# Patient Record
Sex: FEMALE | Race: WHITE | NOT HISPANIC OR LATINO | Employment: OTHER | URBAN - NONMETROPOLITAN AREA
[De-identification: names, ages, dates, MRNs, and addresses within clinical notes are randomized per-mention and may not be internally consistent; named-entity substitution may affect disease eponyms.]

---

## 2017-06-08 ENCOUNTER — OFFICE VISIT (OUTPATIENT)
Dept: CARDIOLOGY | Facility: CLINIC | Age: 82
End: 2017-06-08
Payer: MEDICARE

## 2017-06-08 VITALS
DIASTOLIC BLOOD PRESSURE: 70 MMHG | SYSTOLIC BLOOD PRESSURE: 130 MMHG | HEIGHT: 62 IN | HEART RATE: 70 BPM | BODY MASS INDEX: 22.63 KG/M2 | WEIGHT: 123 LBS

## 2017-06-08 DIAGNOSIS — I10 ESSENTIAL HYPERTENSION: ICD-10-CM

## 2017-06-08 DIAGNOSIS — I48.0 PAROXYSMAL ATRIAL FIBRILLATION (HCC): ICD-10-CM

## 2017-06-08 DIAGNOSIS — I49.1 APC (ATRIAL PREMATURE CONTRACTIONS): ICD-10-CM

## 2017-06-08 PROCEDURE — 99213 OFFICE O/P EST LOW 20 MIN: CPT | Performed by: INTERNAL MEDICINE

## 2017-06-08 RX ORDER — ACETAMINOPHEN 160 MG
TABLET,DISINTEGRATING ORAL
COMMUNITY
End: 2019-02-15 | Stop reason: CLARIF

## 2017-06-08 RX ORDER — DILTIAZEM HYDROCHLORIDE 180 MG/1
180 CAPSULE, EXTENDED RELEASE ORAL DAILY
Qty: 30 CAP | Refills: 11 | Status: SHIPPED | OUTPATIENT
Start: 2017-06-08 | End: 2018-06-08 | Stop reason: SDUPTHER

## 2017-06-08 RX ORDER — LISINOPRIL 10 MG/1
10 TABLET ORAL DAILY
Qty: 30 TAB | Refills: 11 | Status: SHIPPED | OUTPATIENT
Start: 2017-06-08 | End: 2018-06-08 | Stop reason: SDUPTHER

## 2017-06-08 ASSESSMENT — ENCOUNTER SYMPTOMS
HEARTBURN: 0
FEVER: 0
BLURRED VISION: 0
EYE DISCHARGE: 0
DIZZINESS: 0
SHORTNESS OF BREATH: 0
HEADACHES: 0
DEPRESSION: 0
NERVOUS/ANXIOUS: 0
BRUISES/BLEEDS EASILY: 0
CHILLS: 0
COUGH: 0
PALPITATIONS: 0
NAUSEA: 0
MYALGIAS: 0
PND: 0

## 2017-06-08 NOTE — PROGRESS NOTES
Subjective:   Alissa Key is a 89 y.o. 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  No past surgical history on file.  No family history on file.  History   Smoking status   • Never Smoker    Smokeless tobacco   • Never Used     No Known Allergies  Outpatient Encounter Prescriptions as of 6/8/2017   Medication Sig Dispense Refill   • Cholecalciferol (VITAMIN D3) 2000 UNIT Cap Take  by mouth.     • diltiazem (DILT-XR) 180 MG XR capsule Take 1 Cap by mouth every day. 30 Cap 11   • lisinopril (PRINIVIL) 10 MG Tab Take 1 Tab by mouth every day. 30 Tab 11   • aspirin (ASA) 325 MG Tab Take 325 mg by mouth every 6 hours as needed. Pt. Taking 1/4 of the tab     • latanoprost (XALATAN) 0.005 % Solution Place 1 Drop in both eyes every evening.     • [DISCONTINUED] lisinopril (PRINIVIL) 10 MG Tab Take 1 Tab by mouth every day. 30 Tab 11   • [DISCONTINUED] diltiazem (DILT-XR) 180 MG XR capsule Take 1 Cap by mouth every day. 30 Cap 11   • diltiazem CD (CARDIZEM CD) 180 MG CAPSULE SR 24 HR Take 1 Cap by mouth every day. (Patient taking differently: Take 180 mg by mouth every evening.) 30 Cap 11     No facility-administered encounter medications on file as of 6/8/2017.     Review of Systems   Constitutional: Positive for malaise/fatigue. Negative for fever and chills.   Eyes: Negative for blurred vision and discharge.   Respiratory: Negative for cough and shortness of breath.    Cardiovascular: Negative for chest pain, palpitations, leg swelling and PND.   Gastrointestinal: Negative for heartburn and nausea.   Genitourinary: Negative for dysuria and urgency.   Musculoskeletal: Negative for myalgias.   Skin: Negative for itching and rash.   Neurological: Negative for dizziness and headaches.  "  Endo/Heme/Allergies: Negative for environmental allergies. Does not bruise/bleed easily.   Psychiatric/Behavioral: Negative for depression. The patient is not nervous/anxious.         Objective:   /70 mmHg  Pulse 70  Ht 1.575 m (5' 2\")  Wt 55.792 kg (123 lb)  BMI 22.49 kg/m2    Physical Exam   Constitutional: She is oriented to person, place, and time. She appears well-developed and well-nourished.   HENT:   Head: Normocephalic and atraumatic.   Eyes: Conjunctivae and EOM are normal. No scleral icterus.   Neck: Neck supple. No JVD present. No thyromegaly present.   Cardiovascular: Normal rate and regular rhythm.   No extrasystoles are present. Exam reveals no gallop and no friction rub.    Murmur heard.   Systolic murmur is present with a grade of 1/6   Blood pressure 140 systolic, both arms sitting by palpation   Pulmonary/Chest: Effort normal and breath sounds normal. No respiratory distress. She has no wheezes. She has no rales. She exhibits no tenderness.   Abdominal: Soft. Bowel sounds are normal. She exhibits no distension and no mass. There is no tenderness.   Neurological: She is alert and oriented to person, place, and time. Coordination normal.   Skin: Skin is warm and dry. No rash noted. No pallor.   Psychiatric: She has a normal mood and affect. Her behavior is normal. Judgment and thought content normal.       Assessment:     1. Essential hypertension  diltiazem (DILT-XR) 180 MG XR capsule    lisinopril (PRINIVIL) 10 MG Tab   2. Paroxysmal atrial fibrillation (CMS-HCC)     3. APC (atrial premature contractions)  diltiazem (DILT-XR) 180 MG XR capsule       Medical Decision Making:  Today's Assessment / Status / Plan:   Clinical status stable  No syncope. No symptomatic A. Fib.  Home blood pressures well Controlled  No change  In  meds  Return in 1 year    "

## 2017-06-08 NOTE — Clinical Note
[facility]   [address] [place] [address]  Phone: [phone]  Fax: [phone]              Alissa Key  10/26/1921    Encounter Date: 6/8/2017    Simone Schmidt M.D.          PROGRESS NOTE:  Subjective:   Alissa Key is a 89 y.o. 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  No past surgical history on file.  No family history on file.  History   Smoking status   • Never Smoker    Smokeless tobacco   • Never Used     No Known Allergies  Outpatient Encounter Prescriptions as of 6/8/2017   Medication Sig Dispense Refill   • Cholecalciferol (VITAMIN D3) 2000 UNIT Cap Take  by mouth.     • diltiazem (DILT-XR) 180 MG XR capsule Take 1 Cap by mouth every day. 30 Cap 11   • lisinopril (PRINIVIL) 10 MG Tab Take 1 Tab by mouth every day. 30 Tab 11   • aspirin (ASA) 325 MG Tab Take 325 mg by mouth every 6 hours as needed. Pt. Taking 1/4 of the tab     • latanoprost (XALATAN) 0.005 % Solution Place 1 Drop in both eyes every evening.     • [DISCONTINUED] lisinopril (PRINIVIL) 10 MG Tab Take 1 Tab by mouth every day. 30 Tab 11   • [DISCONTINUED] diltiazem (DILT-XR) 180 MG XR capsule Take 1 Cap by mouth every day. 30 Cap 11   • diltiazem CD (CARDIZEM CD) 180 MG CAPSULE SR 24 HR Take 1 Cap by mouth every day. (Patient taking differently: Take 180 mg by mouth every evening.) 30 Cap 11     No facility-administered encounter medications on file as of 6/8/2017.     Review of Systems   Constitutional: Positive for malaise/fatigue. Negative for fever and chills.   Eyes: Negative for blurred vision and discharge.   Respiratory: Negative for cough and shortness of breath.    Cardiovascular: Negative for chest pain,  "palpitations, leg swelling and PND.   Gastrointestinal: Negative for heartburn and nausea.   Genitourinary: Negative for dysuria and urgency.   Musculoskeletal: Negative for myalgias.   Skin: Negative for itching and rash.   Neurological: Negative for dizziness and headaches.   Endo/Heme/Allergies: Negative for environmental allergies. Does not bruise/bleed easily.   Psychiatric/Behavioral: Negative for depression. The patient is not nervous/anxious.         Objective:   /70 mmHg  Pulse 70  Ht 1.575 m (5' 2\")  Wt 55.792 kg (123 lb)  BMI 22.49 kg/m2    Physical Exam   Constitutional: She is oriented to person, place, and time. She appears well-developed and well-nourished.   HENT:   Head: Normocephalic and atraumatic.   Eyes: Conjunctivae and EOM are normal. No scleral icterus.   Neck: Neck supple. No JVD present. No thyromegaly present.   Cardiovascular: Normal rate and regular rhythm.   No extrasystoles are present. Exam reveals no gallop and no friction rub.    Murmur heard.   Systolic murmur is present with a grade of 1/6   Blood pressure 140 systolic, both arms sitting by palpation   Pulmonary/Chest: Effort normal and breath sounds normal. No respiratory distress. She has no wheezes. She has no rales. She exhibits no tenderness.   Abdominal: Soft. Bowel sounds are normal. She exhibits no distension and no mass. There is no tenderness.   Neurological: She is alert and oriented to person, place, and time. Coordination normal.   Skin: Skin is warm and dry. No rash noted. No pallor.   Psychiatric: She has a normal mood and affect. Her behavior is normal. Judgment and thought content normal.       Assessment:     1. Essential hypertension  diltiazem (DILT-XR) 180 MG XR capsule    lisinopril (PRINIVIL) 10 MG Tab   2. Paroxysmal atrial fibrillation (CMS-HCC)     3. APC (atrial premature contractions)  diltiazem (DILT-XR) 180 MG XR capsule       Medical Decision Making:  Today's Assessment / Status / Plan:   "   Clinical status stable  No syncope. No symptomatic A. Fib.  Home blood pressures well Controlled  No change  In  meds  Return in 1 year        WILMA Gloria  [address] [facility] [address]  VIA Facsimile: [phone]

## 2017-12-10 ENCOUNTER — HOSPITAL ENCOUNTER (INPATIENT)
Facility: MEDICAL CENTER | Age: 82
LOS: 1 days | DRG: 481 | End: 2017-12-12
Attending: EMERGENCY MEDICINE | Admitting: HOSPITALIST
Payer: MEDICARE

## 2017-12-10 ENCOUNTER — HOSPITAL ENCOUNTER (OUTPATIENT)
Dept: RADIOLOGY | Facility: MEDICAL CENTER | Age: 82
End: 2017-12-10

## 2017-12-10 ENCOUNTER — APPOINTMENT (OUTPATIENT)
Dept: RADIOLOGY | Facility: MEDICAL CENTER | Age: 82
DRG: 481 | End: 2017-12-10
Attending: EMERGENCY MEDICINE
Payer: MEDICARE

## 2017-12-10 ENCOUNTER — APPOINTMENT (OUTPATIENT)
Dept: RADIOLOGY | Facility: MEDICAL CENTER | Age: 82
DRG: 481 | End: 2017-12-10
Attending: ORTHOPAEDIC SURGERY
Payer: MEDICARE

## 2017-12-10 ENCOUNTER — RESOLUTE PROFESSIONAL BILLING HOSPITAL PROF FEE (OUTPATIENT)
Dept: HOSPITALIST | Facility: MEDICAL CENTER | Age: 82
End: 2017-12-10
Payer: MEDICARE

## 2017-12-10 DIAGNOSIS — S72.142A CLOSED DISPLACED INTERTROCHANTERIC FRACTURE OF LEFT FEMUR, INITIAL ENCOUNTER (HCC): ICD-10-CM

## 2017-12-10 LAB
ALBUMIN SERPL BCP-MCNC: 3.5 G/DL (ref 3.2–4.9)
ALBUMIN/GLOB SERPL: 1.4 G/DL
ALP SERPL-CCNC: 67 U/L (ref 30–99)
ALT SERPL-CCNC: 13 U/L (ref 2–50)
ANION GAP SERPL CALC-SCNC: 7 MMOL/L (ref 0–11.9)
APTT PPP: 26.8 SEC (ref 24.7–36)
AST SERPL-CCNC: 18 U/L (ref 12–45)
BILIRUB SERPL-MCNC: 0.7 MG/DL (ref 0.1–1.5)
BUN SERPL-MCNC: 15 MG/DL (ref 8–22)
CALCIUM SERPL-MCNC: 8.8 MG/DL (ref 8.5–10.5)
CHLORIDE SERPL-SCNC: 105 MMOL/L (ref 96–112)
CO2 SERPL-SCNC: 25 MMOL/L (ref 20–33)
CREAT SERPL-MCNC: 0.59 MG/DL (ref 0.5–1.4)
EKG IMPRESSION: NORMAL
GFR SERPL CREATININE-BSD FRML MDRD: >60 ML/MIN/1.73 M 2
GLOBULIN SER CALC-MCNC: 2.5 G/DL (ref 1.9–3.5)
GLUCOSE SERPL-MCNC: 141 MG/DL (ref 65–99)
INR PPP: 1 (ref 0.87–1.13)
POTASSIUM SERPL-SCNC: 4.1 MMOL/L (ref 3.6–5.5)
PROT SERPL-MCNC: 6 G/DL (ref 6–8.2)
PROTHROMBIN TIME: 12.9 SEC (ref 12–14.6)
SODIUM SERPL-SCNC: 137 MMOL/L (ref 135–145)

## 2017-12-10 PROCEDURE — 160002 HCHG RECOVERY MINUTES (STAT): Performed by: ORTHOPAEDIC SURGERY

## 2017-12-10 PROCEDURE — 502000 HCHG MISC OR IMPLANTS RC 0278: Performed by: ORTHOPAEDIC SURGERY

## 2017-12-10 PROCEDURE — 72170 X-RAY EXAM OF PELVIS: CPT

## 2017-12-10 PROCEDURE — A9270 NON-COVERED ITEM OR SERVICE: HCPCS | Performed by: INTERNAL MEDICINE

## 2017-12-10 PROCEDURE — A6402 STERILE GAUZE <= 16 SQ IN: HCPCS | Performed by: ORTHOPAEDIC SURGERY

## 2017-12-10 PROCEDURE — G0378 HOSPITAL OBSERVATION PER HR: HCPCS

## 2017-12-10 PROCEDURE — 71010 DX-CHEST-PORTABLE (1 VIEW): CPT

## 2017-12-10 PROCEDURE — 93005 ELECTROCARDIOGRAM TRACING: CPT | Performed by: EMERGENCY MEDICINE

## 2017-12-10 PROCEDURE — 160048 HCHG OR STATISTICAL LEVEL 1-5: Performed by: ORTHOPAEDIC SURGERY

## 2017-12-10 PROCEDURE — 700102 HCHG RX REV CODE 250 W/ 637 OVERRIDE(OP): Performed by: ORTHOPAEDIC SURGERY

## 2017-12-10 PROCEDURE — 96374 THER/PROPH/DIAG INJ IV PUSH: CPT

## 2017-12-10 PROCEDURE — 700101 HCHG RX REV CODE 250

## 2017-12-10 PROCEDURE — 160041 HCHG SURGERY MINUTES - EA ADDL 1 MIN LEVEL 4: Performed by: ORTHOPAEDIC SURGERY

## 2017-12-10 PROCEDURE — 700102 HCHG RX REV CODE 250 W/ 637 OVERRIDE(OP): Performed by: INTERNAL MEDICINE

## 2017-12-10 PROCEDURE — 99291 CRITICAL CARE FIRST HOUR: CPT

## 2017-12-10 PROCEDURE — 99220 PR INITIAL OBSERVATION CARE,LEVL III: CPT | Performed by: INTERNAL MEDICINE

## 2017-12-10 PROCEDURE — A9270 NON-COVERED ITEM OR SERVICE: HCPCS | Performed by: ORTHOPAEDIC SURGERY

## 2017-12-10 PROCEDURE — 501838 HCHG SUTURE GENERAL: Performed by: ORTHOPAEDIC SURGERY

## 2017-12-10 PROCEDURE — 700111 HCHG RX REV CODE 636 W/ 250 OVERRIDE (IP)

## 2017-12-10 PROCEDURE — 700111 HCHG RX REV CODE 636 W/ 250 OVERRIDE (IP): Performed by: EMERGENCY MEDICINE

## 2017-12-10 PROCEDURE — 502240 HCHG MISC OR SUPPLY RC 0272: Performed by: ORTHOPAEDIC SURGERY

## 2017-12-10 PROCEDURE — 85730 THROMBOPLASTIN TIME PARTIAL: CPT

## 2017-12-10 PROCEDURE — 160035 HCHG PACU - 1ST 60 MINS PHASE I: Performed by: ORTHOPAEDIC SURGERY

## 2017-12-10 PROCEDURE — 0QS736Z REPOSITION LEFT UPPER FEMUR WITH INTRAMEDULLARY INTERNAL FIXATION DEVICE, PERCUTANEOUS APPROACH: ICD-10-PCS | Performed by: ORTHOPAEDIC SURGERY

## 2017-12-10 PROCEDURE — 700105 HCHG RX REV CODE 258: Performed by: INTERNAL MEDICINE

## 2017-12-10 PROCEDURE — 160029 HCHG SURGERY MINUTES - 1ST 30 MINS LEVEL 4: Performed by: ORTHOPAEDIC SURGERY

## 2017-12-10 PROCEDURE — 73552 X-RAY EXAM OF FEMUR 2/>: CPT | Mod: LT

## 2017-12-10 PROCEDURE — 501445 HCHG STAPLER, SKIN DISP: Performed by: ORTHOPAEDIC SURGERY

## 2017-12-10 PROCEDURE — 80053 COMPREHEN METABOLIC PANEL: CPT

## 2017-12-10 PROCEDURE — 85610 PROTHROMBIN TIME: CPT

## 2017-12-10 PROCEDURE — 160009 HCHG ANES TIME/MIN: Performed by: ORTHOPAEDIC SURGERY

## 2017-12-10 DEVICE — IMPLANTABLE DEVICE: Type: IMPLANTABLE DEVICE | Status: FUNCTIONAL

## 2017-12-10 RX ORDER — LATANOPROST 50 UG/ML
1 SOLUTION/ DROPS OPHTHALMIC NIGHTLY
Status: DISCONTINUED | OUTPATIENT
Start: 2017-12-10 | End: 2017-12-12 | Stop reason: HOSPADM

## 2017-12-10 RX ORDER — AMOXICILLIN 250 MG
2 CAPSULE ORAL 2 TIMES DAILY
Status: DISCONTINUED | OUTPATIENT
Start: 2017-12-10 | End: 2017-12-12 | Stop reason: HOSPADM

## 2017-12-10 RX ORDER — DILTIAZEM HYDROCHLORIDE 180 MG/1
180 CAPSULE, COATED, EXTENDED RELEASE ORAL EVERY EVENING
Status: DISCONTINUED | OUTPATIENT
Start: 2017-12-10 | End: 2017-12-12 | Stop reason: HOSPADM

## 2017-12-10 RX ORDER — LISINOPRIL 10 MG/1
10 TABLET ORAL DAILY
Status: DISCONTINUED | OUTPATIENT
Start: 2017-12-10 | End: 2017-12-12 | Stop reason: HOSPADM

## 2017-12-10 RX ORDER — HYDROMORPHONE HYDROCHLORIDE 2 MG/ML
0.25 INJECTION, SOLUTION INTRAMUSCULAR; INTRAVENOUS; SUBCUTANEOUS
Status: DISCONTINUED | OUTPATIENT
Start: 2017-12-10 | End: 2017-12-12 | Stop reason: HOSPADM

## 2017-12-10 RX ORDER — SODIUM CHLORIDE 9 MG/ML
INJECTION, SOLUTION INTRAVENOUS CONTINUOUS
Status: DISCONTINUED | OUTPATIENT
Start: 2017-12-10 | End: 2017-12-11

## 2017-12-10 RX ORDER — ACETAMINOPHEN 325 MG/1
650 TABLET ORAL EVERY 6 HOURS PRN
Status: DISCONTINUED | OUTPATIENT
Start: 2017-12-10 | End: 2017-12-12 | Stop reason: HOSPADM

## 2017-12-10 RX ORDER — BISACODYL 10 MG
10 SUPPOSITORY, RECTAL RECTAL
Status: DISCONTINUED | OUTPATIENT
Start: 2017-12-10 | End: 2017-12-12 | Stop reason: HOSPADM

## 2017-12-10 RX ORDER — OXYCODONE HYDROCHLORIDE 5 MG/1
5 TABLET ORAL
Status: DISCONTINUED | OUTPATIENT
Start: 2017-12-10 | End: 2017-12-12 | Stop reason: HOSPADM

## 2017-12-10 RX ORDER — POLYETHYLENE GLYCOL 3350 17 G/17G
1 POWDER, FOR SOLUTION ORAL
Status: DISCONTINUED | OUTPATIENT
Start: 2017-12-10 | End: 2017-12-12 | Stop reason: HOSPADM

## 2017-12-10 RX ORDER — MORPHINE SULFATE 4 MG/ML
2 INJECTION, SOLUTION INTRAMUSCULAR; INTRAVENOUS
Status: DISCONTINUED | OUTPATIENT
Start: 2017-12-10 | End: 2017-12-10

## 2017-12-10 RX ORDER — MULTIVIT-MIN/FA/LYCOPEN/LUTEIN .4-300-25
1 TABLET ORAL DAILY
COMMUNITY

## 2017-12-10 RX ORDER — ACETAMINOPHEN 500 MG
500 TABLET ORAL 4 TIMES DAILY
Status: DISCONTINUED | OUTPATIENT
Start: 2017-12-10 | End: 2017-12-12 | Stop reason: HOSPADM

## 2017-12-10 RX ORDER — ASPIRIN 325 MG
325 TABLET ORAL EVERY 6 HOURS PRN
Status: DISCONTINUED | OUTPATIENT
Start: 2017-12-10 | End: 2017-12-10

## 2017-12-10 RX ORDER — LABETALOL HYDROCHLORIDE 5 MG/ML
INJECTION, SOLUTION INTRAVENOUS
Status: COMPLETED
Start: 2017-12-10 | End: 2017-12-10

## 2017-12-10 RX ORDER — OXYCODONE HYDROCHLORIDE 10 MG/1
5 TABLET ORAL EVERY 6 HOURS PRN
Status: DISCONTINUED | OUTPATIENT
Start: 2017-12-10 | End: 2017-12-10

## 2017-12-10 RX ORDER — OXYCODONE HYDROCHLORIDE 5 MG/1
2.5 TABLET ORAL
Status: DISCONTINUED | OUTPATIENT
Start: 2017-12-10 | End: 2017-12-12 | Stop reason: HOSPADM

## 2017-12-10 RX ORDER — MORPHINE SULFATE 4 MG/ML
2 INJECTION, SOLUTION INTRAMUSCULAR; INTRAVENOUS
Status: DISCONTINUED | OUTPATIENT
Start: 2017-12-10 | End: 2017-12-12 | Stop reason: HOSPADM

## 2017-12-10 RX ADMIN — ACETAMINOPHEN 500 MG: 500 TABLET ORAL at 22:14

## 2017-12-10 RX ADMIN — LABETALOL HYDROCHLORIDE 5 MG: 5 INJECTION, SOLUTION INTRAVENOUS at 20:12

## 2017-12-10 RX ADMIN — SODIUM CHLORIDE: 9 INJECTION, SOLUTION INTRAVENOUS at 22:10

## 2017-12-10 RX ADMIN — DILTIAZEM HYDROCHLORIDE 180 MG: 180 CAPSULE, COATED, EXTENDED RELEASE ORAL at 22:16

## 2017-12-10 RX ADMIN — LATANOPROST 1 DROP: 50 SOLUTION OPHTHALMIC at 22:17

## 2017-12-10 RX ADMIN — STANDARDIZED SENNA CONCENTRATE AND DOCUSATE SODIUM 2 TABLET: 8.6; 5 TABLET, FILM COATED ORAL at 22:14

## 2017-12-10 RX ADMIN — MORPHINE SULFATE 2 MG: 4 INJECTION INTRAVENOUS at 17:11

## 2017-12-10 ASSESSMENT — ENCOUNTER SYMPTOMS
DIARRHEA: 0
PALPITATIONS: 0
DIZZINESS: 0
HEADACHES: 0
WEAKNESS: 0
WHEEZING: 0
CHILLS: 0
INSOMNIA: 0
NERVOUS/ANXIOUS: 0
CONSTIPATION: 0
VOMITING: 0
FOCAL WEAKNESS: 0
COUGH: 0
EYE REDNESS: 0
EYE PAIN: 0
FEVER: 0
LOSS OF CONSCIOUSNESS: 0
ABDOMINAL PAIN: 0
FALLS: 1
TREMORS: 0
NAUSEA: 0
SEIZURES: 0
MYALGIAS: 1
BLOOD IN STOOL: 0
SHORTNESS OF BREATH: 0
HEMOPTYSIS: 0

## 2017-12-10 ASSESSMENT — PAIN SCALES - GENERAL
PAINLEVEL_OUTOF10: 0

## 2017-12-10 ASSESSMENT — LIFESTYLE VARIABLES: DO YOU DRINK ALCOHOL: NO

## 2017-12-10 NOTE — ED PROVIDER NOTES
ED Provider Note    CHIEF COMPLAINT  Chief Complaint   Patient presents with   • T-5000 GLF     Tripped over wood stove. Denies LOC. Denies head, neck, or back pain.    • Hip Injury     Left femur fracture. Increased pain with movement. CMS is intact.        HPI  Alissa Key is a 89 y.o. female who presents For evaluation of acute left femur fracture. The patient is a high functioning 89-year-old female. 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 She had a ground level fall on a carpeted surface. She felt a snapping sensation on her left hip. She was seen at the ER in [facility] and confirmed hip fracture. There is no other injuries reported. Lab for studies were performed which were normal she was given some morphine and transferred here for higher level of care as they do not have orthopedics    REVIEW OF SYSTEMS  See HPI for further details. No loss of consciousness numbness weakness or tingling All other systems are negative.     PAST MEDICAL HISTORY  Past Medical History:   Diagnosis Date   • Hypertension      Hypertension  FAMILY HISTORY  No history of bleeding disorder    SOCIAL HISTORY  Social History     Social History   • Marital status:      Spouse name: N/A   • Number of children: N/A   • Years of education: N/A     Social History Main Topics   • Smoking status: Never Smoker   • Smokeless tobacco: Never Used   • Alcohol use No   • Drug use: No   • Sexual activity: Not on file     Other Topics Concern   • Not on file     Social History Narrative   • No narrative on file       SURGICAL HISTORY  No past surgical history on file.    CURRENT MEDICATIONS    Current Facility-Administered Medications:   •  morphine (pf) 4 mg/ml injection 2 mg, 2 mg, Intravenous, Q HOUR PRN, Woody Das M.D.  •  aspirin (ASA) tablet 325 mg, 325 mg, Oral, Q6HRS PRN, Enzo Engle M.D.  •  vitamin D (cholecalciferol) tablet 2,000 Units, 2,000 Units, Oral, DAILY, Enzo DELACRUZ  ALEXY Engle  •  latanoprost (XALATAN) 0.005 % ophthalmic solution 1 Drop, 1 Drop, Both Eyes, Nightly, Enzo Engle M.D.  •  diltiazem CD (CARDIZEM CD) capsule 180 mg, 180 mg, Oral, Q EVENING, Enzo Engle M.D.  •  lisinopril (PRINIVIL) 10 MG tablet 10 mg, 10 mg, Oral, DAILY, Enzo Engle M.D.  •  senna-docusate (PERICOLACE or SENOKOT S) 8.6-50 MG per tablet 2 Tab, 2 Tab, Oral, BID **AND** polyethylene glycol/lytes (MIRALAX) PACKET 1 Packet, 1 Packet, Oral, QDAY PRN **AND** magnesium hydroxide (MILK OF MAGNESIA) suspension 30 mL, 30 mL, Oral, QDAY PRN **AND** bisacodyl (DULCOLAX) suppository 10 mg, 10 mg, Rectal, QDAY PRN, Enzo Engle M.D.  •  NS infusion, , Intravenous, Continuous, Enzo Engle M.D.  •  [START ON 12/11/2017] enoxaparin (LOVENOX) inj 40 mg, 40 mg, Subcutaneous, DAILY, Enzo Engle M.D.  •  acetaminophen (TYLENOL) tablet 650 mg, 650 mg, Oral, Q6HRS PRN, Enzo Engle M.D.  •  Notify provider if pain remains uncontrolled, , , CONTINUOUS **AND** Use the numeric rating scale (NRS-11) on regular floors and Critical-Care Pain Observation Tool (CPOT) on ICUs/Trauma to assess pain, , , CONTINUOUS **AND** Pulse Ox (Oximetry), , , CONTINUOUS **AND** Pharmacy Consult Request ...Pain Management Review, , Other, PRN **AND** If patient difficult to arouse and/or has respiratory depression, stop any opiates that are currently infusing and call a Rapid Response., , , CONTINUOUS **AND** oxycodone immediate-release (ROXICODONE) tablet 2.5 mg, 2.5 mg, Oral, Q3HRS PRN **AND** oxycodone immediate-release (ROXICODONE) tablet 5 mg, 5 mg, Oral, Q3HRS PRN **AND** HYDROmorphone (DILAUDID) injection 0.25 mg, 0.25 mg, Intravenous, Q3HRS PRN, Enzo Engle M.D.    Current Outpatient Prescriptions:   •  Multiple Vitamins-Minerals (CENTRUM SILVER ADULT 50+) Tab, Take 1 Tab by mouth every day., Disp: , Rfl:   •  Cholecalciferol (VITAMIN D3) 2000 UNIT Cap, Take  by mouth., Disp: , Rfl:   •   "diltiazem (DILT-XR) 180 MG XR capsule, Take 1 Cap by mouth every day., Disp: 30 Cap, Rfl: 11  •  lisinopril (PRINIVIL) 10 MG Tab, Take 1 Tab by mouth every day., Disp: 30 Tab, Rfl: 11  •  aspirin (ASA) 325 MG Tab, Take 162.5 mg by mouth every day. Pt. Taking 1/4 of the tab , Disp: , Rfl:   •  latanoprost (XALATAN) 0.005 % Solution, Place 1 Drop in both eyes every evening., Disp: , Rfl:       ALLERGIES  No Known Allergies    PHYSICAL EXAM  VITAL SIGNS: /69   Pulse 93   Temp 36.4 °C (97.6 °F)   Resp (!) 38   Ht 1.575 m (5' 2\")   Wt 54.4 kg (120 lb)   SpO2 92%   BMI 21.95 kg/m²  Room air O2: 93    Constitutional: Well developed, Well nourished, No acute distress, Non-toxic appearance.   HENT: Normocephalic, Atraumatic, Bilateral external ears normal, Oropharynx moist, No oral exudates, Nose normal.   Eyes: PERRLA, EOMI, Conjunctiva normal, No discharge.   Neck: Normal range of motion, No tenderness, Supple, No stridor.   Cardiovascular: Normal heart rate, Normal rhythm, No murmurs, No rubs, No gallops.   Thorax & Lungs: Normal breath sounds, No respiratory distress, No wheezing, No chest tenderness.   Abdomen: Bowel sounds normal, Soft, No tenderness, No masses, No pulsatile masses.   Skin: Warm, Dry, No erythema, No rash.   Back: No tenderness, No CVA tenderness.   Extremities: Intact distal pulses, left lower extremity is shortened and somewhat internally rotated dorsalis pedal pulses and sensation is intact  Neurologic: Alert & oriented x 3, Normal motor function, Normal sensory function, No focal deficits noted.   Psychiatric: Affect normal, Judgment normal, Mood normal.     EKG  TURP rotation by me rate normal. LVH is noted with repolarization O acute ST segment elevation or depression or pathological T-wave inversions L ectopy  DX-CHEST-PORTABLE (1 VIEW)         DX-PELVIS-1 OR 2 VIEWS   Final Result      Left intertrochanteric fracture with angulation      OUTSIDE IMAGES-DX LOWER EXTREMITY, LEFT "   Final Result        Results for orders placed or performed during the hospital encounter of 12/10/17   COMP METABOLIC PANEL   Result Value Ref Range    Sodium 137 135 - 145 mmol/L    Potassium 4.1 3.6 - 5.5 mmol/L    Chloride 105 96 - 112 mmol/L    Co2 25 20 - 33 mmol/L    Anion Gap 7.0 0.0 - 11.9    Glucose 141 (H) 65 - 99 mg/dL    Bun 15 8 - 22 mg/dL    Creatinine 0.59 0.50 - 1.40 mg/dL    Calcium 8.8 8.5 - 10.5 mg/dL    AST(SGOT) 18 12 - 45 U/L    ALT(SGPT) 13 2 - 50 U/L    Alkaline Phosphatase 67 30 - 99 U/L    Total Bilirubin 0.7 0.1 - 1.5 mg/dL    Albumin 3.5 3.2 - 4.9 g/dL    Total Protein 6.0 6.0 - 8.2 g/dL    Globulin 2.5 1.9 - 3.5 g/dL    A-G Ratio 1.4 g/dL   PROTHROMBIN TIME   Result Value Ref Range    PT 12.9 12.0 - 14.6 sec    INR 1.00 0.87 - 1.13   APTT   Result Value Ref Range    APTT 26.8 24.7 - 36.0 sec   ESTIMATED GFR   Result Value Ref Range    GFR If African American >60 >60 mL/min/1.73 m 2    GFR If Non African American >60 >60 mL/min/1.73 m 2   EKG (ER)   Result Value Ref Range    Report       [facility]    Test Date:  2017-12-10  Pt Name:    KAITLYNN NIEVES                Department: ER  MRN:        [id number]                      Room:        18  Gender:     F                            Technician: [id number]  :        [id number]                   Requested By:DEN MYLES  Order #:    [id number]                    Reading MD: DEN MYLES MD    Measurements  Intervals                                Axis  Rate:       85                           P:          30  WY:         208                          QRS:        36  QRSD:       80                           T:          193  QT:         356  QTc:        424    Interpretive Statements  SINUS RHYTHM  PROBABLE LVH WITH SECONDARY REPOL ABNRM  Compared to ECG 2016 15:27:15  Atrial premature complex(es) no longer present    Electronically Signed On 12- 15:37:43 PST by DEN MYLES MD         RADIOLOGY/PROCEDURES  Radiographic muscle facility demonstrates a shortened and displaced left intertrochanteric hip fracture  Laboratory studies from outside facility revealed white blood cell count of 13.3 hemoglobin of 13 hematocrit of 38 platelets 224.  COURSE & MEDICAL DECISION MAKING  Pertinent Labs & Imaging studies reviewed. (See chart for details)  I consulted Dr. Ortiz with orthopedics. He feels that he may be able to get the patient on the schedule for later this afternoon. She'll be kept nothing by mouth. I have ordered as needed morphine for pain. Typical preoperative workup including coagulation studies metabolic panel EKG and chest x-ray have been performed    FINAL IMPRESSION  1. Left intertrochanteric hip fracture         Electronically signed by: Woody Das, 12/10/2017 3:05 PM

## 2017-12-10 NOTE — ED NOTES
GRETCHEN STOREY from [facility] with   Chief Complaint   Patient presents with   • T-5000 GLF     Tripped over wood stove. Denies LOC. Denies head, neck, or back pain.    • Hip Injury     Left femur fracture. Increased pain with movement. CMS is intact.    Received 4 mg Morphine IV and 4 mg Zofran IV PTA. Pain is 0/10 unless movement of LLE.

## 2017-12-11 LAB
ANION GAP SERPL CALC-SCNC: 6 MMOL/L (ref 0–11.9)
APPEARANCE UR: CLEAR
BILIRUB UR QL STRIP.AUTO: NEGATIVE
BUN SERPL-MCNC: 15 MG/DL (ref 8–22)
CALCIUM SERPL-MCNC: 8.3 MG/DL (ref 8.5–10.5)
CHLORIDE SERPL-SCNC: 108 MMOL/L (ref 96–112)
CO2 SERPL-SCNC: 25 MMOL/L (ref 20–33)
COLOR UR: YELLOW
CREAT SERPL-MCNC: 0.61 MG/DL (ref 0.5–1.4)
ERYTHROCYTE [DISTWIDTH] IN BLOOD BY AUTOMATED COUNT: 45.8 FL (ref 35.9–50)
GFR SERPL CREATININE-BSD FRML MDRD: >60 ML/MIN/1.73 M 2
GLUCOSE SERPL-MCNC: 143 MG/DL (ref 65–99)
GLUCOSE UR STRIP.AUTO-MCNC: NEGATIVE MG/DL
HCT VFR BLD AUTO: 29.5 % (ref 37–47)
HGB BLD-MCNC: 9.9 G/DL (ref 12–16)
KETONES UR STRIP.AUTO-MCNC: ABNORMAL MG/DL
LEUKOCYTE ESTERASE UR QL STRIP.AUTO: NEGATIVE
MCH RBC QN AUTO: 33.3 PG (ref 27–33)
MCHC RBC AUTO-ENTMCNC: 33.6 G/DL (ref 33.6–35)
MCV RBC AUTO: 99.3 FL (ref 81.4–97.8)
MICRO URNS: ABNORMAL
NITRITE UR QL STRIP.AUTO: NEGATIVE
PH UR STRIP.AUTO: 5 [PH]
PLATELET # BLD AUTO: 176 K/UL (ref 164–446)
PMV BLD AUTO: 10.7 FL (ref 9–12.9)
POTASSIUM SERPL-SCNC: 4.3 MMOL/L (ref 3.6–5.5)
PROT UR QL STRIP: NEGATIVE MG/DL
RBC # BLD AUTO: 2.97 M/UL (ref 4.2–5.4)
RBC UR QL AUTO: NEGATIVE
SODIUM SERPL-SCNC: 139 MMOL/L (ref 135–145)
SP GR UR STRIP.AUTO: 1.03
UROBILINOGEN UR STRIP.AUTO-MCNC: 0.2 MG/DL
WBC # BLD AUTO: 6.1 K/UL (ref 4.8–10.8)

## 2017-12-11 PROCEDURE — G8978 MOBILITY CURRENT STATUS: HCPCS | Mod: CL

## 2017-12-11 PROCEDURE — 97165 OT EVAL LOW COMPLEX 30 MIN: CPT

## 2017-12-11 PROCEDURE — 97162 PT EVAL MOD COMPLEX 30 MIN: CPT

## 2017-12-11 PROCEDURE — 80048 BASIC METABOLIC PNL TOTAL CA: CPT

## 2017-12-11 PROCEDURE — A9270 NON-COVERED ITEM OR SERVICE: HCPCS | Performed by: ORTHOPAEDIC SURGERY

## 2017-12-11 PROCEDURE — 51798 US URINE CAPACITY MEASURE: CPT

## 2017-12-11 PROCEDURE — 700101 HCHG RX REV CODE 250: Performed by: ORTHOPAEDIC SURGERY

## 2017-12-11 PROCEDURE — 700102 HCHG RX REV CODE 250 W/ 637 OVERRIDE(OP): Performed by: INTERNAL MEDICINE

## 2017-12-11 PROCEDURE — G8979 MOBILITY GOAL STATUS: HCPCS | Mod: CI

## 2017-12-11 PROCEDURE — 99223 1ST HOSP IP/OBS HIGH 75: CPT | Performed by: HOSPITALIST

## 2017-12-11 PROCEDURE — A9270 NON-COVERED ITEM OR SERVICE: HCPCS | Performed by: INTERNAL MEDICINE

## 2017-12-11 PROCEDURE — 81003 URINALYSIS AUTO W/O SCOPE: CPT

## 2017-12-11 PROCEDURE — 85027 COMPLETE CBC AUTOMATED: CPT

## 2017-12-11 PROCEDURE — 770006 HCHG ROOM/CARE - MED/SURG/GYN SEMI*

## 2017-12-11 PROCEDURE — 36415 COLL VENOUS BLD VENIPUNCTURE: CPT

## 2017-12-11 PROCEDURE — 700111 HCHG RX REV CODE 636 W/ 250 OVERRIDE (IP): Performed by: ORTHOPAEDIC SURGERY

## 2017-12-11 PROCEDURE — 700111 HCHG RX REV CODE 636 W/ 250 OVERRIDE (IP): Performed by: INTERNAL MEDICINE

## 2017-12-11 PROCEDURE — G8988 SELF CARE GOAL STATUS: HCPCS | Mod: CI

## 2017-12-11 PROCEDURE — 700102 HCHG RX REV CODE 250 W/ 637 OVERRIDE(OP): Performed by: ORTHOPAEDIC SURGERY

## 2017-12-11 PROCEDURE — G8987 SELF CARE CURRENT STATUS: HCPCS | Mod: CK

## 2017-12-11 RX ADMIN — STANDARDIZED SENNA CONCENTRATE AND DOCUSATE SODIUM 2 TABLET: 8.6; 5 TABLET, FILM COATED ORAL at 20:20

## 2017-12-11 RX ADMIN — ENOXAPARIN SODIUM 40 MG: 100 INJECTION SUBCUTANEOUS at 09:16

## 2017-12-11 RX ADMIN — WATER 2 G: 1 INJECTION INTRAMUSCULAR; INTRAVENOUS; SUBCUTANEOUS at 09:16

## 2017-12-11 RX ADMIN — LISINOPRIL 10 MG: 10 TABLET ORAL at 07:47

## 2017-12-11 RX ADMIN — LATANOPROST 1 DROP: 50 SOLUTION OPHTHALMIC at 20:20

## 2017-12-11 RX ADMIN — DILTIAZEM HYDROCHLORIDE 180 MG: 180 CAPSULE, COATED, EXTENDED RELEASE ORAL at 20:20

## 2017-12-11 RX ADMIN — WATER 2 G: 1 INJECTION INTRAMUSCULAR; INTRAVENOUS; SUBCUTANEOUS at 01:54

## 2017-12-11 RX ADMIN — ACETAMINOPHEN 500 MG: 500 TABLET ORAL at 07:46

## 2017-12-11 RX ADMIN — OXYCODONE HYDROCHLORIDE 2.5 MG: 5 TABLET ORAL at 07:46

## 2017-12-11 RX ADMIN — ACETAMINOPHEN 500 MG: 500 TABLET ORAL at 20:20

## 2017-12-11 RX ADMIN — VITAMIN D, TAB 1000IU (100/BT) 2000 UNITS: 25 TAB at 07:45

## 2017-12-11 ASSESSMENT — PAIN SCALES - GENERAL
PAINLEVEL_OUTOF10: 3
PAINLEVEL_OUTOF10: 0
PAINLEVEL_OUTOF10: 1
PAINLEVEL_OUTOF10: 1
PAINLEVEL_OUTOF10: 0
PAINLEVEL_OUTOF10: 1
PAINLEVEL_OUTOF10: 1

## 2017-12-11 ASSESSMENT — GAIT ASSESSMENTS
ASSISTIVE DEVICE: FRONT WHEEL WALKER
DISTANCE (FEET): 5
GAIT LEVEL OF ASSIST: MODERATE ASSIST

## 2017-12-11 ASSESSMENT — COGNITIVE AND FUNCTIONAL STATUS - GENERAL
MOBILITY SCORE: 8
TURNING FROM BACK TO SIDE WHILE IN FLAT BAD: UNABLE
DAILY ACTIVITIY SCORE: 19
SUGGESTED CMS G CODE MODIFIER MOBILITY: CM
TOILETING: A LITTLE
MOVING TO AND FROM BED TO CHAIR: UNABLE
SUGGESTED CMS G CODE MODIFIER DAILY ACTIVITY: CK
HELP NEEDED FOR BATHING: A LOT
WALKING IN HOSPITAL ROOM: A LOT
MOVING FROM LYING ON BACK TO SITTING ON SIDE OF FLAT BED: UNABLE
DRESSING REGULAR LOWER BODY CLOTHING: A LOT
CLIMB 3 TO 5 STEPS WITH RAILING: TOTAL
STANDING UP FROM CHAIR USING ARMS: A LOT

## 2017-12-11 ASSESSMENT — PATIENT HEALTH QUESTIONNAIRE - PHQ9
2. FEELING DOWN, DEPRESSED, IRRITABLE, OR HOPELESS: NOT AT ALL
SUM OF ALL RESPONSES TO PHQ QUESTIONS 1-9: 0
1. LITTLE INTEREST OR PLEASURE IN DOING THINGS: NOT AT ALL
SUM OF ALL RESPONSES TO PHQ9 QUESTIONS 1 AND 2: 0

## 2017-12-11 ASSESSMENT — LIFESTYLE VARIABLES
EVER_SMOKED: NEVER
ALCOHOL_USE: NO

## 2017-12-11 ASSESSMENT — ACTIVITIES OF DAILY LIVING (ADL): TOILETING: INDEPENDENT

## 2017-12-11 NOTE — DISCHARGE PLANNING
Received choice form from [facility] at 1047.  Referral sent to [facility] at 1053 on 12-.

## 2017-12-11 NOTE — H&P
Hospital Medicine History and Physical    Date of Service  12/10/2017    Chief Complaint  Chief Complaint   Patient presents with   • T-5000 GLF     Tripped over wood stove. Denies LOC. Denies head, neck, or back pain.    • Hip Injury     Left femur fracture. Increased pain with movement. CMS is intact.        History of Presenting Illness  89 y.o. female who presented 12/10/2017 with fall sustaining L hip fracture. She has a wood stove in the middle of living room and she felt she tripped there. Prior to that she complains of no lightheadedness, chest pain, shortness of breath, palpitations. For her age, she has no medical problems other than hypertension, and mentation is sharp  At ED, afebrile, hemodynamically stable. Xrays showed the aforementioned fracture  When I saw her at ED, no acute distress. Mentation sharp. L hip soreness. Auscultation clear. Son and his wife at bedside.  Primary Care Physician  GLO Huizar.    Consultants  Orthopedics    Code Status  full    Review of Systems  Review of Systems   Constitutional: Negative for chills and fever.   HENT: Negative for congestion, hearing loss and nosebleeds.    Eyes: Negative for pain and redness.   Respiratory: Negative for cough, hemoptysis, shortness of breath and wheezing.    Cardiovascular: Negative for chest pain and palpitations.   Gastrointestinal: Negative for abdominal pain, blood in stool, constipation, diarrhea, nausea and vomiting.   Genitourinary: Negative for dysuria, frequency and hematuria.   Musculoskeletal: Positive for falls, joint pain and myalgias.   Skin: Negative for rash.   Neurological: Negative for dizziness, tremors, focal weakness, seizures, loss of consciousness, weakness and headaches.   Psychiatric/Behavioral: The patient is not nervous/anxious and does not have insomnia.    All other systems reviewed and are negative.       Past Medical History  Past Medical History:   Diagnosis Date   • Hypertension        Surgical  History  No past surgical history on file.    Medications  No current facility-administered medications on file prior to encounter.      Current Outpatient Prescriptions on File Prior to Encounter   Medication Sig Dispense Refill   • Cholecalciferol (VITAMIN D3) 2000 UNIT Cap Take  by mouth.     • diltiazem (DILT-XR) 180 MG XR capsule Take 1 Cap by mouth every day. 30 Cap 11   • lisinopril (PRINIVIL) 10 MG Tab Take 1 Tab by mouth every day. 30 Tab 11   • aspirin (ASA) 325 MG Tab Take 162.5 mg by mouth every day. Pt. Taking 1/4 of the tab      • latanoprost (XALATAN) 0.005 % Solution Place 1 Drop in both eyes every evening.         Family History  History reviewed. No pertinent family history.    Social History  Social History   Substance Use Topics   • Smoking status: Never Smoker   • Smokeless tobacco: Never Used   • Alcohol use No       Allergies  No Known Allergies     Physical Exam  Laboratory   Hemodynamics  Temp (24hrs), Av.8 °C (98.2 °F), Min:36.4 °C (97.6 °F), Max:37.6 °C (99.7 °F)   Temperature: 36.7 °C (98 °F)  Pulse  Av  Min: 70  Max: 95 Heart Rate (Monitored): 69  Blood Pressure : 118/55, NIBP: 143/52      Respiratory      Respiration: 16, Pulse Oximetry: 94 %             Physical Exam   Constitutional: She appears well-developed and well-nourished.   Frail elderly   HENT:   Head: Normocephalic and atraumatic.   Eyes: Conjunctivae and EOM are normal. No scleral icterus.   Neck: Normal range of motion. Neck supple.   Cardiovascular: Normal rate and regular rhythm.  Exam reveals no gallop and no friction rub.    No murmur heard.  Pulmonary/Chest: Effort normal and breath sounds normal. No respiratory distress. She has no wheezes. She has no rales.   Abdominal: Soft. Bowel sounds are normal. She exhibits no distension. There is no tenderness. There is no rebound and no guarding.   Musculoskeletal: She exhibits tenderness (L hip soreness). She exhibits no edema.   Neurological: She is alert.   Skin:  Skin is warm.   Psychiatric: She has a normal mood and affect. Her behavior is normal.           Recent Labs      12/10/17   1610   SODIUM  137   POTASSIUM  4.1   CHLORIDE  105   CO2  25   GLUCOSE  141*   BUN  15   CREATININE  0.59   CALCIUM  8.8     Recent Labs      12/10/17   1610   ALTSGPT  13   ASTSGOT  18   ALKPHOSPHAT  67   TBILIRUBIN  0.7   GLUCOSE  141*     Recent Labs      12/10/17   1610   APTT  26.8   INR  1.00             No results found for: TROPONINI  Urinalysis:  No results found for: SPECGRAVITY, GLUCOSEUR, KETONES, NITRITE, WBCURINE, RBCURINE, BACTERIA, EPITHELCELL     Imaging  Dx-chest-portable (1 View)    Result Date: 12/10/2017  12/10/2017 3:36 PM HISTORY/REASON FOR EXAM: Preoperative analysis, left femur fracture. Atherosclerosis. TECHNIQUE/EXAM DESCRIPTION AND NUMBER OF VIEWS: Single AP view of the chest. COMPARISON: None FINDINGS: Lungs: No consolidation detected. Large volumes Pleura:  No pleural space process is seen. Heart and mediastinum: There is moderate aortic ectasia, cardiac silhouette enlargement. No acute displaced fracture is seen     Aortic ectasia, cardiac enlargement. No consolidation detected    Dx-pelvis-1 Or 2 Views    Result Date: 12/10/2017  12/10/2017 4:09 PM HISTORY/REASON FOR EXAM:  Pelvic/Hip Pain Following Trauma. Preoperative for orthopedic surgery, left hip fracture TECHNIQUE/EXAM DESCRIPTION AND NUMBER OF VIEWS:  1 view(s) of the pelvis. COMPARISON:  Outside left femur x-ray 12/10/2017 FINDINGS: There is a left intertrochanteric fracture with angulation. Bony pelvis is intact. There is facet arthropathy of the lumbar spine. There is osteoarthritis of both hip joint.     Left intertrochanteric fracture with angulation    Dx-femur-2+ Left    Result Date: 12/10/2017  12/10/2017 6:00 PM HISTORY/REASON FOR EXAM:  Pain/Deformity Following Trauma. Femur fracture status post repair. TECHNIQUE/EXAM DESCRIPTION AND NUMBER OF VIEWS:  4 views of the LEFT femur. COMPARISON:  Pelvic x-ray from 4:08 PM FINDINGS: Fluoroscopic images of the proximal left femur demonstrate placement of antegrade intramedullary nail and hip screw with at least one distal interlocking screw. Number of images: 4 Fluoroscopy time: 2.4 minutes     Status post ORIF of proximal left femur fracture.     Assessment/Plan     I anticipate this patient is appropriate for observation status at this time.    * Fracture, intertrochanteric, left femur (CMS-HCC)   Assessment & Plan    Fell accidentally sustained L hip fracture  Prior to fall, no ACS, decompensated CHF or resp failure, malignant arrhythmia.  For her age, she is pretty healthy and sharp  Orthopedics consulted.  Ordered pain control, bowel regimen, IVF            VTE prophylaxis: SCD.    I spent 72 minutes, reviewing the chart, notes, vitals, labs, imaging, ordering labs, evaluating Alissa Key for assessment, enacting the plan above. 50% of the time was spent in counseling Alissa Key and son, answering questions. Discussed with ED physician. Time was devoted to counseling and coordinating care including review of records, pertinent lab data and studies, as well as discussing diagnostic evaluation and work up, planned therapeutic interventions and future disposition of care. Where indicated, the assessment and plan reflect discussion of patient with consultants, other healthcare providers, family members, and additional research needed to obtain further information in formulating the plan of care for Alissa Key.

## 2017-12-11 NOTE — PROGRESS NOTES
Progress Note               Author: Mik Ortiz Date & Time created: 2017  6:04 AM     Interval History:  S/p left hip fracture nailing - doing well today no complaints     Review of Systems:  ROS    Physical Exam:  Physical Exam  Left LE Ankle/toes dorsiflex and plantar flex, intact to light touch, good cap refill, good pulses, no pain with passive range of motion , calves soft  Thigh soft     Labs:        Invalid input(s): [id number]      Recent Labs      12/10/17   1610  17   0322   SODIUM  137  139   POTASSIUM  4.1  4.3   CHLORIDE  105  108   CO2  25  25   BUN  15  15   CREATININE  0.59  0.61   CALCIUM  8.8  8.3*     Recent Labs      12/10/17   16117   0322   ALTSGPT  13   --    ASTSGOT  18   --    ALKPHOSPHAT  67   --    TBILIRUBIN  0.7   --    GLUCOSE  141*  143*     Recent Labs      12/10/17   16117   0322   RBC   --   2.97*   HEMOGLOBIN   --   9.9*   HEMATOCRIT   --   29.5*   PLATELETCT   --   176   PROTHROMBTM  12.9   --    APTT  26.8   --    INR  1.00   --      Recent Labs      12/10/17   16117   0322   WBC   --   6.1   ASTSGOT  18   --    ALTSGPT  13   --    ALKPHOSPHAT  67   --    TBILIRUBIN  0.7   --      Hemodynamics:  Temp (24hrs), Av.7 °C (98 °F), Min:36.4 °C (97.6 °F), Max:37.6 °C (99.7 °F)  Temperature: 36.5 °C (97.7 °F)  Pulse  Av.7  Min: 70  Max: 95Heart Rate (Monitored): 69  Blood Pressure : 125/63, NIBP: 143/52     Respiratory:    Respiration: 16, Pulse Oximetry: 97 %           Fluids:    Intake/Output Summary (Last 24 hours) at 17 0604  Last data filed at 17 0535   Gross per 24 hour   Intake              900 ml   Output              350 ml   Net              550 ml     Weight: 54.4 kg (120 lb)  GI/Nutrition:  Orders Placed This Encounter   Procedures   • DIET ORDER     Standing Status:   Standing     Number of Occurrences:   1     Order Specific Question:   Diet:     Answer:   Regular [1]     Comments:   Vegetarian diet  with chicken or turkey meats     Medical Decision Making, by Problem:  Active Hospital Problems    Diagnosis   • *Fracture, intertrochanteric, left femur (CMS-HCC) [M51.289A]       Plan:  POD 1 left hip fracture nailing -   WBAT , PT  lovenox   Placement when stable per IM  -  snf, home health, or acute rehab     Quality-Core Measures

## 2017-12-11 NOTE — CONSULTS
DATE OF SERVICE:  12/10/2017    REQUESTING PHYSICIAN:  Dr. Woody Das, emergency department.    REASON FOR CONSULTATION:  Left intertrochanteric hip fracture.    HISTORY OF PRESENT ILLNESS:  Patient is a 89-year-old female, lives alone in   the [place] area, is independent with activities of daily living.  She   tripped on uneven ground on the carpet, felt a pop in her hip.  Denies chest   pain, lightheadedness, dizziness before fall.  Denies pain elsewhere.    PAST MEDICAL HISTORY:  Significant for hypertension.    PAST SURGICAL HISTORY:  None.    MEDICATIONS:  Reports she takes aspirin, vitamin D, lisinopril, diltiazem.    SOCIAL HISTORY:  Lives alone.  Nonsmoker, nondrinker, .    FAMILY HISTORY:  Significant for hypertension and coronary artery disease.    REVIEW OF SYSTEMS:  Negative.    PHYSICAL EXAMINATION:  VITAL SIGNS:  Temperature 36.4, pulse 93, blood pressure 151/69, respirations   32, satting 92% on room air.  BMI is 22.  GENERAL:  She is alert, oriented, and appropriate.  In no acute distress.  HEENT:  Normocephalic, atraumatic.  NECK:  Supple, nontender to palpation with good range of motion.  CHEST:  Clear to auscultation.  HEART:  Regular rate and rhythm.  ABDOMEN:  Benign.    NEUROPSYCHIATRIC:  Neurologically and psychiatrically intact.  EXTREMITIES:  Left lower extremity shortened and externally rotated.  She is   able to dorsiflex and plantar flex.  Intact to light, sensory, touch.  Toes   are pink and warm with brisk capillary refill.    IMAGING:  My independent review of 2 views of the left hip show   intratrochanteric hip fracture.    IMPRESSION AND PLAN:  A 89-year-old female with left intertrochanteric hip   fracture, going to be evaluated by internal medicine, labs will be ordered.    When cleared for surgery, we will proceed for a cephalomedullary nail.       ____________________________________     MD SIOMARA Horan / FARIHA    DD:  12/10/2017 20:09:51  DT:   12/10/2017 20:32:54    D#:  [id number]  Job#:  [id number]

## 2017-12-11 NOTE — OP REPORT
DATE OF SERVICE:  12/10/2017    PREOPERATIVE DIAGNOSIS:  Left intertrochanteric hip fracture.    POSTOPERATIVE DIAGNOSIS:  Left intertrochanteric hip fracture.    PROCEDURE:  Cephalomedullary nailing of left intratrochanteric hip fracture.    SURGEON:  Mik Ortiz MD    ASSISTANT:  Jarrett Yadav PA-C    ANESTHESIA:  General.    ANESTHESIOLOGIST:  Jesus Baldwin MD    BLOOD LOSS:  150 mL.    COMPLICATIONS:  None apparent.    DISPOSITION:  PACU.    CONDITION:  Stable.    INDICATIONS:  The patient is a 89-year-old female who had a mechanical fall   and suffered a left intratrochanteric hip fracture.  We discussed risks,   benefits, rationale of nailing including but not limited to infection,   neurovascular injury, incomplete relief of symptoms, need for further surgery,   DVT, PE, complications of anesthesia.  I had a discussion with patient and   her son prior to surgery.  He demonstrated understanding.  Informed consent   was signed and placed on the chart.  All their questions were answered.  No   guarantees implied or given.    TECHNIQUE:  Both patient and I agreed the correct operative extremity.  Left   hip was signed and marked in preoperative holding.  She received preoperative   IV Ancef prophylaxis and was taken to the operative suite.  After adequate   anesthesia, time-out was taken by all in the room to identify the correct   patient, limb, and procedure.  He was placed on the fracture against a   well-padded perineal post.  Reduction was achieved.  Left leg was sterilely   prepped and draped in standard fashion.  Percutaneous incision was made to   _____ guide pin in the center of the trochanter.  This was advanced, verified   by both AP and lateral x-rays.  The proximal femur was opened.  Guide christianne was   placed and the knee reamed up to a 13.5 for a size 12 largest nail available,   which was placed.  Lag screw was then placed in the center-center in the head.    Using perfect [place]  technique, a locking screw was placed distally in the   shaft.  Final imaging showed reduction of the fracture.  Wounds were copiously   irrigated.  A 2-0 Vicryl was used for subcutaneous tissue and staples for the   skin.  Xeroform soft compressive dressing applied.  Patient was transferred   to recovery in stable condition.  Counts were correct.  No apparent   complications.  Jarrett Yadav assisted throughout the case.       ____________________________________     MD SIOMARA Horan / FARIHA    DD:  12/10/2017 20:12:03  DT:  12/10/2017 20:28:23    D#:  [id number]  Job#:  [id number]

## 2017-12-11 NOTE — H&P
CHIEF COMPLAINT:  Hip pain.    HISTORY OF PRESENT ILLNESS:  This is a 89-year-old female who presents after   having had a ground level fall at home.  She apparently has a wood stove in   the middle of her living room and tripped over it.  She denies any symptoms   prior to the event and specifically denies any dizziness, lightheadedness,   diaphoresis or loss of consciousness.  After falling, she was unable to get   up.    In the emergency room, imaging studies have been performed, which have   demonstrated a left intertrochanteric hip fracture.    REVIEW OF SYSTEMS:  Positive as noted, otherwise all systems are reviewed and   negative.    PREVIOUS MEDICAL HISTORY:  Hypertension.    MEDICATIONS:  1.  Vitamin D3 2000 units p.o. q. day.  2.  Diltiazem 180 mg p.o. q. day.  3.  Lisinopril 10 mg p.o. q. day.  4.  Aspirin 325 mg p.o. q. day.  5.  Xalatan ophthalmic.    ALLERGIES:  No known drug allergies.    SOCIAL HISTORY:  Patient does not smoke or drink.  She lives alone, but has   family who lives about a mile or two away.    SURGICAL HISTORY:  Denies.    FAMILY HISTORY:  Not relevant in this 89-year-old female.    PHYSICAL EXAMINATION:  VITAL SIGNS:  Temperature 36.4, heart rate 78, respiratory rate 16, /53,   satting in the upper 90s on room air.  GENERAL:  The patient is awake, alert.  She is in no acute distress.  HEENT:  Head is normocephalic and atraumatic.  Mucous membranes are moist.    Sclerae and conjunctivae are benign.  NECK:  Trachea is in the midline.  Neck is supple.  There is no JVD or bruits.    No tenderness to palpation.  RESPIRATORY:  Clear to auscultation bilaterally.  CARDIAC:  There is a II/VI systolic murmur, greatest at the apex.  No rubs or   clicks.  ABDOMEN:  Soft.  No guarding, rebound, hepatosplenomegaly or masses.  EXTREMITIES:  Good peripheral pulses in the DP and T-piece as well as radial   and ulnar arteries.  Calves are nontender to palpation with no palpable cords.    There  is some pain with movement of the left hip.  SKIN:  Warm and dry.  No rashes are appreciated.  NEUROLOGIC:  She is alert, oriented and nonfocal.  PSYCHIATRIC:  Mood and affect are appropriate.  Hygiene neat and clean.    LABORATORY DATA:  White count 6.1, hemoglobin 9.9, platelet count 176.  Sodium   139, potassium 4.3, BUN 15, creatinine 0.61.  UA is unremarkable.    ASSESSMENT AND PLAN:  1.  Left intertrochanteric fracture:  Patient has been taken to the OR by Dr. Mik Ortiz with an IM fixation completed.  She is currently   weightbearing as tolerated and working with physical therapy.  We will work on   getting her home, which is her and her family's wish, they have refused   placement in a rehab facility.  She is a very high functioning lady and has   done well with therapy so far.  We will arrange for home health PT and OT to   follow with her.  As regard to equipment, she has wheelchair, walker and   shower chair when her  was ill.  2.  Hypertension:  Continue diltiazem, monitor and titrate.  3.  Prophylaxis:  Heparin subQ, no indication for GI prophylaxis.       ____________________________________     DO JOEY Perez / FARIHA    DD:  12/11/2017 10:16:57  DT:  12/11/2017 10:42:12    D#:  [id number]  Job#:  [id number]    cc: RAYNE GUADARRAMA

## 2017-12-11 NOTE — CARE PLAN
Problem: Safety  Goal: Will remain free from injury  Outcome: PROGRESSING AS EXPECTED  Call light within reach; treaded socks on; bed in the lowest position and wheels locked; family at the bedside; hourly rounding in place.     Problem: Venous Thromboembolism (VTW)/Deep Vein Thrombosis (DVT) Prevention:  Goal: Patient will participate in Venous Thrombosis (VTE)/Deep Vein Thrombosis (DVT)Prevention Measures  Outcome: PROGRESSING AS EXPECTED  SCD's to BLE on.

## 2017-12-11 NOTE — PROGRESS NOTES
Pt arrived to unit with transport from PACU. Pt A/O x 4. Denies of any pain at this time. On RA, O2 sat 98%. Dressings to left hip CDI. SCD's to BLE on. Call light within reach. Son at the bedside.

## 2017-12-11 NOTE — PROGRESS NOTES
Assumed care of pt @0700. Bedside report received. Pt AOX 4. Pt complains about pain. Roxicodone 2.5 mg given. PIV SL. Dressing to L hip CDI. Pt up with 1 peson assist and FWW. CMS +Fall precaution in place. POC discussed with pt, all questions answered at this time. Pt makes needs known, call light within reach, hourly rounding in place.

## 2017-12-11 NOTE — CARE PLAN
Problem: Pain Management  Goal: Pain level will decrease to patient's comfort goal  Outcome: PROGRESSING AS EXPECTED  Pt complains about pain. Roxicodone 2.5 mg given.    Problem: Mobility  Goal: Risk for activity intolerance will decrease  Outcome: PROGRESSING AS EXPECTED  Pt ambulated to the bathroom with 1 person assist and FWW.

## 2017-12-11 NOTE — THERAPY
"Occupational Therapy Evaluation completed.   Functional Status: Pt is mod a supine to sit, mod a sit to stand, max a to don socks, SUA for seated grooming, mod a xfer to chair. Pt limited by pain and weakness but motivated for therapy.   Plan of Care: Will benefit from Occupational Therapy 3 times per week  Discharge Recommendations:  Equipment: Will Continue to Assess for Equipment Needs. Post-acute therapy Discharge to a transitional care facility for continued skilled therapy services. and Discharge to home with outpatient or home health for additional skilled therapy services.    Pt is motivated and has supportive son to assist who can stay with pt after dc from acute care. Pt prefers to go home with son rather than post acute rehab/SNF stay. Progress will determine best venue after d/c.     See \"Rehab Therapy-Acute\" Patient Summary Report for complete documentation.    "

## 2017-12-11 NOTE — DISCHARGE PLANNING
"Ground level fall resulting in an intertrochanteric hip fracture requiring Cephalomedullary nailing of left intratrochanteric hip fracture.  Dos 12/10/2017. Per Therapy note \"she is an excellent acute rehab candidate.\" Please consider a PMR referral to assist with discharge planning.   "

## 2017-12-11 NOTE — FACE TO FACE
Face to Face Supporting Documentation - Home Health    The encounter with this patient was in whole or in part the primary reason for home health admission.    Date of encounter:   Patient:                    MRN:                       YOB: 2017  Alissa Key  [id number]  10/26/1921     Home health to see patient for:  Physical Therapy evaluation and treatment and Occupational therapy evaluation and treatment    Skilled need for:  Surgical Aftercare IM nailing intertrochanteric Fx    Skilled nursing interventions to include:  Comment: PT/OT    Homebound status evidenced by:  Need the aid of supportive devices such as crutches, canes, wheelchairs or walkers. Leaving home requires a considerable and taxing effort. There is a normal inability to leave the home.    Community Physician to provide follow up care: WILMA Huizar     Optional Interventions? Yes, Details: WBAT      I certify the face to face encounter for this home health care referral meets the CMS requirements and the encounter/clinical assessment with the patient was, in whole, or in part, for the medical condition(s) listed above, which is the primary reason for home health care. Based on my clinical findings: the service(s) are medically necessary, support the need for home health care, and the homebound criteria are met.  I certify that this patient has had a face to face encounter by myself.  Aldo Murphy D.O. - NPI: [id number]

## 2017-12-11 NOTE — OR SURGEON
Immediate Post OP Note    PreOp Diagnosis: left IT hip fracture     PostOp Diagnosis: same     Procedure(s):  FEMUR NAILING INTRAMEDULLARY - Wound Class: Clean    Surgeon(s):  Mik Ortiz M.D.    Anesthesiologist/Type of Anesthesia:  Anesthesiologist: Jesus Baldwin M.D./General    Surgical Staff:  Assistant: Jarrett Yadav P.A.-C.  Circulator: Grecia Lopez RJERO  Relief Circulator: Mary Jo Triplett RJERO  Scrub Person: Braulio Calderon; Sravani Shrestha    Specimens:  * No specimens in log *    Estimated Blood Loss: 150cc    Findings: left IT hip fracture     Complications: no apparent         12/10/2017 8:12 PM Mik Ortiz

## 2017-12-11 NOTE — PROGRESS NOTES
2 RN skin check done with THADDEUS Domingo. Pt's skin intact except for bruises to back of bilateral upper arms and left upper thigh. Redness to sacrum but blanching.

## 2017-12-11 NOTE — ED NOTES
Med rec updated and complete.  Allergies reviewed.  Pt denies antibiotic use in last 30 days.  Pt took all morning doses.

## 2017-12-11 NOTE — THERAPY
"Physical Therapy Evaluation completed.   Bed Mobility:  Supine to Sit: Maximal Assist  Transfers: Sit to Stand: Moderate Assist  Gait: Level Of Assist: Moderate Assist with Front-Wheel Walker       Plan of Care: Will benefit from Physical Therapy 5 times per week  Discharge Recommendations: Equipment: Will Continue to Assess for Equipment Needs. Post-acute therapy Discharge to a transitional care facility for continued skilled therapy services.    Ms. Key is a 89 y/o female who presents to acute secondary to ground level fall with L intertrochanteric hip fracture. She is s/p intramedullary nailing. She presents with significant lower extremity weakness, impaired dynamic balance, gross motor coordination deficits, and pain in L LE. These impairments negatively impact her ability to perform gait, transfers, bed mobility, and stairs at her prior level of function and prevent her safe discharge home. She does have very supportive family, however based on the severity of her limitations at this point recommend post acute rehabiliation prior to discharge home. She is an excellent acute rehab candidate and is very motivated to participate in therapy activities. She would benefit from additional physical therapy services during acute stay to improve her mobility and decrease risk for falls.     See \"Rehab Therapy-Acute\" Patient Summary Report for complete documentation.     "

## 2017-12-11 NOTE — DISCHARGE PLANNING
TCN met with patient and son at bedside to discuss the care teams recommendation for HH. Patient is agreeable to suggestion and selected [facility]. Choice signed and faxed to [facility]. TCN to follow as needed.     Son provided additional Cell phone numbers  [place] [phone]  Alissa [phone]

## 2017-12-12 VITALS
BODY MASS INDEX: 22.08 KG/M2 | SYSTOLIC BLOOD PRESSURE: 143 MMHG | HEIGHT: 62 IN | TEMPERATURE: 98.1 F | RESPIRATION RATE: 15 BRPM | OXYGEN SATURATION: 97 % | HEART RATE: 75 BPM | WEIGHT: 120 LBS | DIASTOLIC BLOOD PRESSURE: 69 MMHG

## 2017-12-12 PROBLEM — D64.9 ANEMIA: Status: ACTIVE | Noted: 2017-12-12

## 2017-12-12 PROBLEM — I10 HTN (HYPERTENSION): Status: ACTIVE | Noted: 2017-12-12

## 2017-12-12 PROCEDURE — 97535 SELF CARE MNGMENT TRAINING: CPT

## 2017-12-12 PROCEDURE — 700111 HCHG RX REV CODE 636 W/ 250 OVERRIDE (IP): Performed by: HOSPITALIST

## 2017-12-12 PROCEDURE — 99239 HOSP IP/OBS DSCHRG MGMT >30: CPT | Performed by: HOSPITALIST

## 2017-12-12 PROCEDURE — 97530 THERAPEUTIC ACTIVITIES: CPT

## 2017-12-12 PROCEDURE — 700102 HCHG RX REV CODE 250 W/ 637 OVERRIDE(OP): Performed by: INTERNAL MEDICINE

## 2017-12-12 PROCEDURE — A9270 NON-COVERED ITEM OR SERVICE: HCPCS | Performed by: INTERNAL MEDICINE

## 2017-12-12 RX ORDER — OXYCODONE HYDROCHLORIDE 5 MG/1
5 TABLET ORAL EVERY 6 HOURS PRN
Qty: 20 TAB | Refills: 0 | Status: SHIPPED | OUTPATIENT
Start: 2017-12-12 | End: 2019-02-15 | Stop reason: CLARIF

## 2017-12-12 RX ORDER — AMOXICILLIN 250 MG
2 CAPSULE ORAL 2 TIMES DAILY
Qty: 30 TAB | Refills: 0 | Status: SHIPPED | OUTPATIENT
Start: 2017-12-12 | End: 2019-02-15 | Stop reason: CLARIF

## 2017-12-12 RX ADMIN — LISINOPRIL 10 MG: 10 TABLET ORAL at 07:45

## 2017-12-12 RX ADMIN — OXYCODONE HYDROCHLORIDE 5 MG: 5 TABLET ORAL at 06:47

## 2017-12-12 RX ADMIN — STANDARDIZED SENNA CONCENTRATE AND DOCUSATE SODIUM 2 TABLET: 8.6; 5 TABLET, FILM COATED ORAL at 07:44

## 2017-12-12 RX ADMIN — ENOXAPARIN SODIUM 30 MG: 100 INJECTION SUBCUTANEOUS at 07:44

## 2017-12-12 RX ADMIN — VITAMIN D, TAB 1000IU (100/BT) 2000 UNITS: 25 TAB at 07:44

## 2017-12-12 ASSESSMENT — COGNITIVE AND FUNCTIONAL STATUS - GENERAL
WALKING IN HOSPITAL ROOM: A LITTLE
MOVING TO AND FROM BED TO CHAIR: UNABLE
SUGGESTED CMS G CODE MODIFIER DAILY ACTIVITY: CK
CLIMB 3 TO 5 STEPS WITH RAILING: TOTAL
MOBILITY SCORE: 10
MOVING FROM LYING ON BACK TO SITTING ON SIDE OF FLAT BED: UNABLE
HELP NEEDED FOR BATHING: A LOT
SUGGESTED CMS G CODE MODIFIER MOBILITY: CL
TOILETING: A LOT
DAILY ACTIVITIY SCORE: 18
DRESSING REGULAR LOWER BODY CLOTHING: A LOT
TURNING FROM BACK TO SIDE WHILE IN FLAT BAD: UNABLE
STANDING UP FROM CHAIR USING ARMS: A LITTLE

## 2017-12-12 ASSESSMENT — PAIN SCALES - GENERAL
PAINLEVEL_OUTOF10: 1
PAINLEVEL_OUTOF10: 5

## 2017-12-12 ASSESSMENT — GAIT ASSESSMENTS
GAIT LEVEL OF ASSIST: CONTACT GUARD ASSIST
ASSISTIVE DEVICE: FRONT WHEEL WALKER
DISTANCE (FEET): 15
DEVIATION: ANTALGIC;BRADYKINETIC;SHUFFLED GAIT

## 2017-12-12 NOTE — CARE PLAN
Problem: Knowledge Deficit  Goal: Knowledge of disease process/condition, treatment plan, diagnostic tests, and medications will improve  POC discussed. Pt understands that she will DC today. All questions and concerns addressed.

## 2017-12-12 NOTE — THERAPY
"Occupational Therapy Treatment completed with focus on ADLs, ADL transfers and patient education.  Functional Status:  Pt seen for OT tx. Pt up in room w/ CNA upon arrival. Pt was SBA for amb to BR w/ FWW. Mod A transfer for toileting and supervision for pericare. Mod A to don LB dressing, min A w/ AE. Pt c/o high levels of pain at this time. Pt and son report that he will be able to provide assistance as needed upon appropriate medical d/c home.   Plan of Care: Will benefit from Occupational Therapy 3 times per week  Discharge Recommendations:  Equipment Will Continue to Assess for Equipment Needs.    See \"Rehab Therapy-Acute\" Patient Summary Report for complete documentation.   "

## 2017-12-12 NOTE — PROGRESS NOTES
Pt discharged to home with Home Health via wheelchair escorted with Anaid ALVARADO.  All discharge instructions given, questions and concerns addressed.   All prescriptions given.   PIV removed without complications.  Follow up appt discussed.

## 2017-12-12 NOTE — PROGRESS NOTES
Pt A/O x 4. Denies having any pain at this time. PIV to left arm patent; dressing CDI. Dressings to left hip CDI. SCD's to BLE on. All assessment done and charted. POC discussed. Son at the bedside. Call light within reach. Hourly rounding in place.

## 2017-12-12 NOTE — PROGRESS NOTES
Pt is AAO x4  Denies any pain or discomfort at this time.  VS WNL.  L hip dressing in place, CDI.  PIV patent, saline locked.  SCDs in place.   POC discussed.  All needs met at this time.  Bed in low position.  Call light within reach.  Rounding in place.

## 2017-12-12 NOTE — CARE PLAN
Problem: Safety  Goal: Will remain free from falls  Outcome: PROGRESSING AS EXPECTED  Pt educated to call before getting out of bed; call light within reach; bed in the lowest position and wheels locked; treaded socks on; hourly rounding in place.     Problem: Bowel/Gastric:  Goal: Normal bowel function is maintained or improved  Outcome: PROGRESSING AS EXPECTED  Stool softener given as prescribed.

## 2017-12-12 NOTE — DISCHARGE PLANNING
Pt's co-pay for Xarelto is $293.58. Informed pt and son, they are able to afford. Will call [facility] back and ask them to fill RX.

## 2017-12-12 NOTE — DISCHARGE INSTRUCTIONS
Discharge Instructions    Discharged to home by car with relative. Discharged via wheelchair, hospital escort: Yes.  Special equipment needed: Not Applicable    Be sure to schedule a follow-up appointment with your primary care doctor or any specialists as instructed.     Discharge Plan:   Diet Plan: Discussed  Activity Level: Discussed  Confirmed Follow up Appointment: Patient to Call and Schedule Appointment  Confirmed Symptoms Management: Discussed  Medication Reconciliation Updated: Yes  Influenza Vaccine Indication: Not indicated: Previously immunized this influenza season and > 8 years of age    I understand that a diet low in cholesterol, fat, and sodium is recommended for good health. Unless I have been given specific instructions below for another diet, I accept this instruction as my diet prescription.   Other diet: Regular    Special Instructions: Discharge instructions for the Orthopedic Patient    Follow up with Primary Care Physician within 2 weeks of discharge to home, regarding:  Review of medications and diagnostic testing.  Surveillance for medical complications.  Workup and treatment of osteoporosis, if appropriate.     -Is this a Joint Replacement patient? No    -Is this patient being discharged with medication to prevent blood clots?  Yes, Xarelto Rivaroxaban oral tablets  What is this medicine?  RIVAROXABAN (ri va STEW a ban) is an anticoagulant (blood thinner). It is used to treat blood clots in the lungs or in the veins. It is also used after knee or hip surgeries to prevent blood clots. It is also used to lower the chance of stroke in people with a medical condition called atrial fibrillation.  This medicine may be used for other purposes; ask your health care provider or pharmacist if you have questions.  COMMON BRAND NAME(S): Xarelto  What should I tell my health care provider before I take this medicine?  They need to know if you have any of these conditions:  -bleeding  disorders  -bleeding in the brain  -blood in your stools (black or tarry stools) or if you have blood in your vomit  -history of stomach bleeding  -kidney disease  -liver disease  -low blood counts, like low white cell, platelet, or red cell counts  -recent or planned spinal or epidural procedure  -take medicines that treat or prevent blood clots  -an unusual or allergic reaction to rivaroxaban, other medicines, foods, dyes, or preservatives  -pregnant or trying to get pregnant  -breast-feeding  How should I use this medicine?  Take this medicine by mouth with a glass of water. Follow the directions on the prescription label. Take your medicine at regular intervals. Do not take it more often than directed. Do not stop taking except on your doctor's advice.  If you are taking this medicine after hip or knee replacement surgery, take it with or without food.  If you are taking this medicine for atrial fibrillation, take it with your evening meal. If you are taking this medicine to treat blood clots, take it with food at the same time each day. If you are unable to swallow your tablet, you may crush the tablet and mix it in applesauce. Then, immediately eat the applesauce. You should eat more food right after you eat the applesauce containing the crushed tablet.  Talk to your pediatrician regarding the use of this medicine in children. Special care may be needed.  Overdosage: If you think you've taken too much of this medicine contact a poison control center or emergency room at once.  Overdosage: If you think you have taken too much of this medicine contact a poison control center or emergency room at once.  NOTE: This medicine is only for you. Do not share this medicine with others.  What if I miss a dose?  If you take your medicine once a day and miss a dose, take the missed dose as soon as you remember. If you take your medicine twice a day and miss a dose, take the missed dose immediately. In this instance, 2  tablets may be taken at the same time. The next day you should take 1 tablet twice a day as directed.  What may interact with this medicine?  -aspirin and aspirin-like medicines  -certain antibiotics like erythromycin, azithromycin, and clarithromycin  -certain medicines for fungal infections like ketoconazole and itraconazole  -certain medicines for irregular heart beat like amiodarone, quinidine, dronedarone  -certain medicines for seizures like carbamazepine, phenytoin  -certain medicines that treat or prevent blood clots like warfarin, enoxaparin, and dalteparin   -conivaptan  -diltiazem  -felodipine  -indinavir  -lopinavir; ritonavir  -NSAIDS, medicines for pain and inflammation, like ibuprofen or naproxen  -ranolazine  -rifampin  -ritonavir  -Xiomara's wort  -verapamil  This list may not describe all possible interactions. Give your health care provider a list of all the medicines, herbs, non-prescription drugs, or dietary supplements you use. Also tell them if you smoke, drink alcohol, or use illegal drugs. Some items may interact with your medicine.  What should I watch for while using this medicine?  Do not stop taking this medicine without first talking to your doctor. Stopping this medicine may increase your risk of having a stroke. Be sure to refill your prescription before you run out of medicine.  This medicine may increase your risk to bruise or bleed. Call your doctor or health care professional if you notice any unusual bleeding.  Be careful brushing and flossing your teeth or using a toothpick because you may bleed more easily. If you have any dental work done, tell your dentist you are receiving this medicine.  What side effects may I notice from receiving this medicine?  Side effects that you should report to your doctor or health care professional as soon as possible:  -allergic reactions like skin rash, itching or hives, swelling of the face, lips, or tongue  -back pain  -bloody or black,  tarry stools  -changes in vision  -confusion, trouble speaking or understanding  -red or dark-brown urine  -redness, blistering, peeling or loosening of the skin, including inside the mouth  -severe headaches  -spitting up blood or brown material that looks like coffee grounds  -sudden numbness or weakness of the face, arm or leg  -trouble walking, dizziness, loss of balance or coordination  -unusual bruising or bleeding from the eye, gums, or nose   Side effects that usually do not require medical attention (Report these to your doctor or health care professional if they continue or are bothersome.):  -dizziness  -muscle pain  This list may not describe all possible side effects. Call your doctor for medical advice about side effects. You may report side effects to FDA at 8-836-FDA-1347.  Where should I keep my medicine?  Keep out of the reach of children.  Store at room temperature between 15 and 30 degrees C (59 and 86 degrees F). Throw away any unused medicine after the expiration date.  NOTE: This sheet is a summary. It may not cover all possible information. If you have questions about this medicine, talk to your doctor, pharmacist, or health care provider.  © 2014, [employer]. (3/17/2014 3:32:09 PM)      · Is patient discharged on Warfarin / Coumadin?   No     · Is patient Post Blood Transfusion?  No    Depression / Suicide Risk    As you are discharged from this [facility] facility, it is important to learn how to keep safe from harming yourself.    Recognize the warning signs:  · Abrupt changes in personality, positive or negative- including increase in energy   · Giving away possessions  · Change in eating patterns- significant weight changes-  positive or negative  · Change in sleeping patterns- unable to sleep or sleeping all the time   · Unwillingness or inability to communicate  · Depression  · Unusual sadness, discouragement and loneliness  · Talk of wanting to die  · Neglect of  personal appearance   · Rebelliousness- reckless behavior  · Withdrawal from people/activities they love  · Confusion- inability to concentrate     If you or a loved one observes any of these behaviors or has concerns about self-harm, here's what you can do:  · Talk about it- your feelings and reasons for harming yourself  · Remove any means that you might use to hurt yourself (examples: pills, rope, extension cords, firearm)  · Get professional help from the community (Mental Health, Substance Abuse, psychological counseling)  · Do not be alone:Call your Safe Contact- someone whom you trust who will be there for you.  · Call your local CRISIS HOTLINE 866-2735 or 903-538-6820  · Call your local Children's Mobile Crisis Response Team [place] (056) 823-8218 or www.[employer]  · Call the toll free National Suicide Prevention Hotlines   · National Suicide Prevention Lifeline 058-142-GMTW (0168)  · National Hope Line Network 800-SUICIDE (213-2221)

## 2017-12-12 NOTE — THERAPY
"Physical Therapy Treatment completed.   Bed Mobility:  Supine to Sit: Moderate Assist  Transfers: Sit to Stand: Moderate Assist (Luda for higher surfaces)  Gait: Level Of Assist: Contact Guard Assist with Front-Wheel Walker       Plan of Care: Will benefit from Physical Therapy 5 times per week  Discharge Recommendations: Equipment: No Equipment Needed. Pt owns FWW.    See \"Rehab Therapy-Acute\" Patient Summary Report for complete documentation.     Pt is still presenting w/ impaired functional mobility. Pt main limitation appears to be pain and decreased strength/ROM in the L LE. Pt requires assistance w/ most functional mobility. Pt son given training on functional mobility to ease transition home. Pt will require HH upon DC as pt still requires a lot of assistance.  "

## 2017-12-13 NOTE — DISCHARGE SUMMARY
DATE OF ADMISSION:  12/10/2017    DATE OF DISCHARGE:  12/12/2017    IDENTIFICATION:  This is a 89-year-old female who follows up closely with Dr. Brigitte Jerome as well as orthopedic surgery with Dr. Mik Ortiz associates.    DISCHARGE DIAGNOSES:  1.  Status post ground level fall suffering a left intertrochanteric hip   fracture, status post open reduction and internal fixation by Dr. Mik Ortiz.  2.  Postoperative anemia.  3.  History of hypertension.  4.  Glaucoma.  5.  Advanced age.    DISCHARGE MEDICATIONS:  1.  Vitamin D3, 2000 units daily.  2.  Diltiazem 180 XR mg p.o. daily.  3.  Walker, she does have at home.  4.  Xalatan 0.005% solution 1 drop in both eyes evenings.  5.  Lisinopril 10 mg daily.  6.  Multivitamin p.o. daily.  7.  Roxicodone 1 tablet q. 6 hours p.r.n. severe pain.  8.  Xarelto 10 mg p.o. daily for the next 21 days until fully mobilized.  9.  Mireya-Colace 2 tablets p.o. b.i.d. for constipation prevention.    For presenting symptoms, HPI and physical findings, please refer to the   dictated H and P.    CONSULTATIONS OBTAINED:  Dr. Mik Ortiz from orthopedic surgery.    PROCEDURES PERFORMED:  Cephalomedullary nailing of the left intertrochanteric   hip fracture by Dr. Mik Ortiz with good results.    HOSPITAL COURSE:  The patient was admitted after a ground-level fall where she   suffered a left intertrochanteric hip fracture.  The patient underwent the   above-mentioned surgery without difficulty.  She was mobilized with PT, OT and   had good results.  She was not interested in any type of institutionalization   of inpatient rehabilitation.  She did overall well.  She had already all   equipments at home for mobilization from her 's illnesses prior.  The   patient was referred to home health, which has been arranged for.  The   patient, at this time, is able to be discharged to home with an overall stable   improved condition with close outpatient followup with  her primary care   physician, Dr. Mik Ortiz in followup.  The patient understands   instructions and will be discharged to home with family.    LABORATORY DATA AND IMAGING:  Showed white blood cell count of 6.1, hemoglobin   of 9.9, and platelet count of 176.  Chemistry is essentially normal except   for glucose of 143.  Her urinalysis was negative.  For full further details,   please refer to the computer system and paper chart.    Time spent on discharge is 45 minutes.       ____________________________________     MD ARABELLA MALONE / FARIHA    DD:  12/12/2017 17:21:33  DT:  12/12/2017 19:34:19    D#:  [id number]  Job#:  [id number]    cc: Mik Ortiz MD, RAYNE GUADARRAMA

## 2017-12-13 NOTE — DOCUMENTATION QUERY
DOCUMENTATION QUERY    PROVIDERS: Please select “Cosign w/ note”to reply to query.    To better represent the severity of illness of your patient, please review the following information and exercise your independent professional judgment in responding to this query.     12/12/17 DC summary by Dr. Sanders documents postoperative anemia.     H&H on 12/11/17 is 9.9 and 29.5     OP report by Dr. Ortiz documents estimated blood loss of 150 cc       Based upon the clinical findings, risk factors, and treatment, can this diagnosis be further specified?    • Acute blood loss anemia  • Chronic blood loss anemia  • Other explanation of clinical findings  • Unable to determine           The medical record reflects the following:   Clinical Findings Documented post operative anemia with an estimated blood loss of 150 cc intra op.   H&H 9.9 & 29.5    Treatment Lab draws    Risk Factors Advanced age   Left femur fracture requiring intramedullary nailing   Hypertension    Location within medical record Discharge Summary and Lab Results      Thank you,   Jacquelyn Haynes RN  Clinical   [phone]

## 2017-12-13 NOTE — PROGRESS NOTES
Patient seen and examined today.    Patient tolerating treatment and therapies.  All Data, Medication data reviewed.  Case discussed with nursing as available.  Plan of Care reviewed with patient and notified of changes.    Ok for d/c  See summary

## 2017-12-13 NOTE — DISCHARGE SUMMARY
DATE OF ADMISSION:  12/10/2017    DATE OF DISCHARGE:  12/12/2017    DISCHARGE DIAGNOSES:  1.  Femoral neck fracture, status post intramedullary nailing.  2.  Ground level fall resulting in above.  3.  Hypertension.    CONSULTANTS:  Dr. Mik Ortiz, orthopedic surgery.    PROCEDURE:  Femur nailing, intramedullary by Dr. Mik Ortiz On   12/10/2017.    IMAGING STUDIES:  Plain films of the pelvis on 12/10/2017.  Findings:  Left   intertrochanteric fracture with angulation.    LABORATORY DATA:  On discharge, white count 6.1, hemoglobin 9.9, platelet   count 176.  Sodium 139, potassium 4.3, BUN 15, creatinine 0.61.  LFTs all   within normal limits.  INR 1.0.  UA unremarkable.    HOSPITAL COURSE:  This is a very vigorous 89-year-old female who presented to   our facility after having had a ground level fall at home.  She tripped over   her wood stove apparently in her living room.  She suffered hip pain as a   result of this and was unable to stand up.  Imaging demonstrated a left   intertrochanteric fracture.    Patient was admitted and Dr. Mik Ortiz was consulted.  He took her to   the OR on12/10/2017 for an IM nail fixation.    Patient did quite well postoperatively.  On 12/11/2017, the patient was up and   walking around.  I entertained a long discussion with the patient as well as   her family at the bedside.  We did recommend to her that she go to skilled   nursing facility for further rehab prior to discharge home; however, she quite   adamantly refused our kind offer.  Her family reports that they can move in   with her and will be with her 24/7 until she is back and forth.  We are   therefore discharging the patient home to the care of her family with home   health, physical therapy, occupational therapy, nursing to visit her, and   confirm that she is recovering well.    DISCHARGE MEDICATIONS:  1.  Cholecalciferol 2000 units p.o. daily.  2.  Diltiazem  mg p.o. every day.  3.   Latanoprost.  4.  Lisinopril 10 mg p.o. daily.  5.  Multivitamin.    FOLLOWUP:  The patient has a followup scheduled with home health as noted   above.  She is to follow up as well with Dr. Ortiz in the next 2 weeks.  She   is also to follow up with primary care physician.    DISCHARGE TIME:  40 minutes, the majority of that time spent with the patient   and her family reviewing discharge planning and I have reviewed details.  On   the day of discharge, she is quite eager to go home and feels confident that   she will do well.       ____________________________________     DO JOEY Perez / FARIHA    DD:  12/12/2017 17:22:29  DT:  12/12/2017 22:55:57    D#:  [id number]  Job#:  [id number]

## 2018-06-08 DIAGNOSIS — I49.1 APC (ATRIAL PREMATURE CONTRACTIONS): ICD-10-CM

## 2018-06-08 DIAGNOSIS — I10 ESSENTIAL HYPERTENSION: ICD-10-CM

## 2018-06-08 RX ORDER — DILTIAZEM HYDROCHLORIDE 180 MG/1
180 CAPSULE, EXTENDED RELEASE ORAL DAILY
Qty: 90 CAP | Refills: 0 | Status: SHIPPED | OUTPATIENT
Start: 2018-06-08

## 2018-06-08 RX ORDER — LISINOPRIL 10 MG/1
10 TABLET ORAL DAILY
Qty: 90 TAB | Refills: 0 | Status: SHIPPED | OUTPATIENT
Start: 2018-06-08

## 2019-02-15 ENCOUNTER — APPOINTMENT (OUTPATIENT)
Dept: RADIOLOGY | Facility: MEDICAL CENTER | Age: 84
DRG: 436 | End: 2019-02-15
Attending: EMERGENCY MEDICINE
Payer: MEDICARE

## 2019-02-15 ENCOUNTER — HOSPITAL ENCOUNTER (INPATIENT)
Facility: MEDICAL CENTER | Age: 84
LOS: 1 days | DRG: 436 | End: 2019-02-16
Attending: EMERGENCY MEDICINE | Admitting: HOSPITALIST
Payer: MEDICARE

## 2019-02-15 ENCOUNTER — HOSPITAL ENCOUNTER (OUTPATIENT)
Dept: RADIOLOGY | Facility: MEDICAL CENTER | Age: 84
End: 2019-02-15

## 2019-02-15 DIAGNOSIS — C78.7 LIVER METASTASES: ICD-10-CM

## 2019-02-15 PROBLEM — R91.1 PULMONARY NODULE: Status: ACTIVE | Noted: 2019-02-15

## 2019-02-15 PROBLEM — K56.1 INTUSSUSCEPTION (HCC): Status: ACTIVE | Noted: 2019-02-15

## 2019-02-15 PROBLEM — E78.5 DYSLIPIDEMIA: Status: ACTIVE | Noted: 2019-02-15

## 2019-02-15 PROBLEM — Z79.01 CHRONIC ANTICOAGULATION: Status: ACTIVE | Noted: 2019-02-15

## 2019-02-15 PROBLEM — I48.91 ATRIAL FIBRILLATION (HCC): Status: ACTIVE | Noted: 2019-02-15

## 2019-02-15 PROBLEM — Z71.89 ADVANCE CARE PLANNING: Status: ACTIVE | Noted: 2019-02-15

## 2019-02-15 PROBLEM — R74.8 ELEVATED LIVER ENZYMES: Status: ACTIVE | Noted: 2019-02-15

## 2019-02-15 LAB
ABO GROUP BLD: NORMAL
ABO GROUP BLD: NORMAL
APTT PPP: 34.3 SEC (ref 24.7–36)
BLD GP AB SCN SERPL QL: NORMAL
CEA SERPL-MCNC: 288.9 NG/ML (ref 0–3)
FERRITIN SERPL-MCNC: 188.3 NG/ML (ref 10–291)
FOLATE SERPL-MCNC: >23.6 NG/ML
HGB RETIC QN AUTO: 26.1 PG/CELL (ref 29–35)
IMM RETICS NFR: 20.9 % (ref 9.3–17.4)
INR PPP: 1.35 (ref 0.87–1.13)
IRON SATN MFR SERPL: 6 % (ref 15–55)
IRON SERPL-MCNC: 12 UG/DL (ref 40–170)
PROTHROMBIN TIME: 16.8 SEC (ref 12–14.6)
RETICS # AUTO: 0.07 M/UL (ref 0.04–0.06)
RETICS/RBC NFR: 2.1 % (ref 0.8–2.1)
RH BLD: NORMAL
RH BLD: NORMAL
TIBC SERPL-MCNC: 204 UG/DL (ref 250–450)
VIT B12 SERPL-MCNC: >1500 PG/ML (ref 211–911)

## 2019-02-15 PROCEDURE — 83550 IRON BINDING TEST: CPT

## 2019-02-15 PROCEDURE — 74018 RADEX ABDOMEN 1 VIEW: CPT

## 2019-02-15 PROCEDURE — 700101 HCHG RX REV CODE 250: Performed by: HOSPITALIST

## 2019-02-15 PROCEDURE — 82728 ASSAY OF FERRITIN: CPT

## 2019-02-15 PROCEDURE — 86901 BLOOD TYPING SEROLOGIC RH(D): CPT

## 2019-02-15 PROCEDURE — 86850 RBC ANTIBODY SCREEN: CPT

## 2019-02-15 PROCEDURE — 86900 BLOOD TYPING SEROLOGIC ABO: CPT

## 2019-02-15 PROCEDURE — 99223 1ST HOSP IP/OBS HIGH 75: CPT | Mod: 25 | Performed by: HOSPITALIST

## 2019-02-15 PROCEDURE — 94760 N-INVAS EAR/PLS OXIMETRY 1: CPT

## 2019-02-15 PROCEDURE — 700105 HCHG RX REV CODE 258: Performed by: HOSPITALIST

## 2019-02-15 PROCEDURE — 82378 CARCINOEMBRYONIC ANTIGEN: CPT

## 2019-02-15 PROCEDURE — 85610 PROTHROMBIN TIME: CPT

## 2019-02-15 PROCEDURE — 99497 ADVNCD CARE PLAN 30 MIN: CPT | Performed by: HOSPITALIST

## 2019-02-15 PROCEDURE — 99285 EMERGENCY DEPT VISIT HI MDM: CPT

## 2019-02-15 PROCEDURE — 770009 HCHG ROOM/CARE - ONCOLOGY SEMI PRI*

## 2019-02-15 PROCEDURE — 82746 ASSAY OF FOLIC ACID SERUM: CPT

## 2019-02-15 PROCEDURE — 36415 COLL VENOUS BLD VENIPUNCTURE: CPT

## 2019-02-15 PROCEDURE — 85046 RETICYTE/HGB CONCENTRATE: CPT

## 2019-02-15 PROCEDURE — 83540 ASSAY OF IRON: CPT

## 2019-02-15 PROCEDURE — 82607 VITAMIN B-12: CPT

## 2019-02-15 PROCEDURE — 85730 THROMBOPLASTIN TIME PARTIAL: CPT

## 2019-02-15 RX ORDER — SODIUM CHLORIDE AND POTASSIUM CHLORIDE 150; 900 MG/100ML; MG/100ML
INJECTION, SOLUTION INTRAVENOUS CONTINUOUS
Status: DISCONTINUED | OUTPATIENT
Start: 2019-02-15 | End: 2019-02-15

## 2019-02-15 RX ORDER — ATORVASTATIN CALCIUM 20 MG/1
20 TABLET, FILM COATED ORAL NIGHTLY
COMMUNITY

## 2019-02-15 RX ORDER — BISACODYL 10 MG
10 SUPPOSITORY, RECTAL RECTAL
Status: DISCONTINUED | OUTPATIENT
Start: 2019-02-15 | End: 2019-02-16 | Stop reason: HOSPADM

## 2019-02-15 RX ORDER — DILTIAZEM HYDROCHLORIDE 180 MG/1
180 CAPSULE, COATED, EXTENDED RELEASE ORAL
Status: DISCONTINUED | OUTPATIENT
Start: 2019-02-16 | End: 2019-02-16 | Stop reason: HOSPADM

## 2019-02-15 RX ORDER — ACETAMINOPHEN 325 MG/1
650 TABLET ORAL EVERY 6 HOURS PRN
Status: DISCONTINUED | OUTPATIENT
Start: 2019-02-15 | End: 2019-02-16 | Stop reason: HOSPADM

## 2019-02-15 RX ORDER — MORPHINE SULFATE 4 MG/ML
1-3 INJECTION, SOLUTION INTRAMUSCULAR; INTRAVENOUS
Status: DISCONTINUED | OUTPATIENT
Start: 2019-02-15 | End: 2019-02-15

## 2019-02-15 RX ORDER — POLYETHYLENE GLYCOL 3350 17 G/17G
1 POWDER, FOR SOLUTION ORAL
Status: DISCONTINUED | OUTPATIENT
Start: 2019-02-15 | End: 2019-02-16 | Stop reason: HOSPADM

## 2019-02-15 RX ORDER — LISINOPRIL 10 MG/1
10 TABLET ORAL DAILY
Status: DISCONTINUED | OUTPATIENT
Start: 2019-02-16 | End: 2019-02-16 | Stop reason: HOSPADM

## 2019-02-15 RX ORDER — HYDRALAZINE HYDROCHLORIDE 10 MG/1
5 TABLET, FILM COATED ORAL EVERY 6 HOURS PRN
Status: DISCONTINUED | OUTPATIENT
Start: 2019-02-15 | End: 2019-02-16 | Stop reason: HOSPADM

## 2019-02-15 RX ORDER — MORPHINE SULFATE 15 MG/1
7.5 TABLET ORAL EVERY 6 HOURS PRN
Status: DISCONTINUED | OUTPATIENT
Start: 2019-02-15 | End: 2019-02-16 | Stop reason: HOSPADM

## 2019-02-15 RX ORDER — MORPHINE SULFATE 4 MG/ML
1-3 INJECTION, SOLUTION INTRAMUSCULAR; INTRAVENOUS
Status: DISCONTINUED | OUTPATIENT
Start: 2019-02-15 | End: 2019-02-16 | Stop reason: HOSPADM

## 2019-02-15 RX ORDER — ATORVASTATIN CALCIUM 20 MG/1
20 TABLET, FILM COATED ORAL NIGHTLY
Status: DISCONTINUED | OUTPATIENT
Start: 2019-02-16 | End: 2019-02-15

## 2019-02-15 RX ORDER — AMOXICILLIN 250 MG
2 CAPSULE ORAL 2 TIMES DAILY
Status: DISCONTINUED | OUTPATIENT
Start: 2019-02-15 | End: 2019-02-16 | Stop reason: HOSPADM

## 2019-02-15 RX ORDER — SODIUM CHLORIDE 9 MG/ML
INJECTION, SOLUTION INTRAVENOUS CONTINUOUS
Status: DISCONTINUED | OUTPATIENT
Start: 2019-02-15 | End: 2019-02-16 | Stop reason: HOSPADM

## 2019-02-15 RX ADMIN — POTASSIUM CHLORIDE AND SODIUM CHLORIDE: 900; 150 INJECTION, SOLUTION INTRAVENOUS at 10:19

## 2019-02-15 RX ADMIN — SODIUM CHLORIDE: 9 INJECTION, SOLUTION INTRAVENOUS at 14:20

## 2019-02-15 ASSESSMENT — ENCOUNTER SYMPTOMS
NERVOUS/ANXIOUS: 0
SEIZURES: 0
LOSS OF CONSCIOUSNESS: 0
MYALGIAS: 0
EYE REDNESS: 0
BLOOD IN STOOL: 0
SPEECH CHANGE: 0
FEVER: 0
EYE PAIN: 0
WEAKNESS: 1
SORE THROAT: 0
BRUISES/BLEEDS EASILY: 0
VOMITING: 0
DIARRHEA: 0
PALPITATIONS: 0
STRIDOR: 0
WEIGHT LOSS: 1
HEMOPTYSIS: 0
FLANK PAIN: 0
EYE DISCHARGE: 0
COUGH: 0
SHORTNESS OF BREATH: 0
SPUTUM PRODUCTION: 0
FOCAL WEAKNESS: 0
HALLUCINATIONS: 0
CHILLS: 0

## 2019-02-15 ASSESSMENT — PATIENT HEALTH QUESTIONNAIRE - PHQ9
1. LITTLE INTEREST OR PLEASURE IN DOING THINGS: NOT AT ALL
SUM OF ALL RESPONSES TO PHQ9 QUESTIONS 1 AND 2: 0
2. FEELING DOWN, DEPRESSED, IRRITABLE, OR HOPELESS: NOT AT ALL

## 2019-02-15 ASSESSMENT — COGNITIVE AND FUNCTIONAL STATUS - GENERAL
PERSONAL GROOMING: A LITTLE
MOVING FROM LYING ON BACK TO SITTING ON SIDE OF FLAT BED: A LITTLE
DAILY ACTIVITIY SCORE: 19
WALKING IN HOSPITAL ROOM: A LITTLE
SUGGESTED CMS G CODE MODIFIER MOBILITY: CK
STANDING UP FROM CHAIR USING ARMS: A LITTLE
MOBILITY SCORE: 19
SUGGESTED CMS G CODE MODIFIER DAILY ACTIVITY: CK
DRESSING REGULAR LOWER BODY CLOTHING: A LITTLE
HELP NEEDED FOR BATHING: A LITTLE
TOILETING: A LITTLE
DRESSING REGULAR UPPER BODY CLOTHING: A LITTLE
CLIMB 3 TO 5 STEPS WITH RAILING: A LITTLE
MOVING TO AND FROM BED TO CHAIR: A LITTLE

## 2019-02-15 ASSESSMENT — LIFESTYLE VARIABLES
EVER_SMOKED: NEVER
EVER_SMOKED: NEVER
DO YOU DRINK ALCOHOL: NO

## 2019-02-15 NOTE — H&P
Hospital Medicine History & Physical Note    Date of Service  2/15/2019    Primary Care Physician  WILMA Huizar    Consultants  Interventional radiology    Code Status  DNAR/DNI    Chief Complaint  Abdominal discomfort, sent from [facility] for oncology workup and consult    History of Presenting Illness  89 y.o. female with past medical history of atrial fibrillation on anticoagulation that is rate controlled, hypertension that is well controlled and dyslipidemia who was transferred from [facility] on 2/15/2019 after she was found to have multiple liver masses consistent with metastasis.  The patient was seen there for mild-moderate abdominal pain going on for 10 days prior to admission, epigastric and right hypochondriac in location 3-6 in intensity, described as intermittent colicky, and sharp.  The pain is worse with movement and with a eating and is relieved by not eating.  The pain does not radiate to other locations.  The pain is not associated with nausea or vomiting.  The patient reports having generalized weakness and easy fatigability. She denies having nausea, vomiting fevers, chills and bleeding from her rectum. Her CT abdomen pelvis chest from the transferring facility. There is no free intraperitoneal air.  There are multiple lesions throughout the liver, likely metastases.     Review of Systems  Review of Systems   Constitutional: Positive for malaise/fatigue and weight loss. Negative for chills and fever.   HENT: Negative for congestion and sore throat.    Eyes: Negative for pain, discharge and redness.   Respiratory: Negative for cough, hemoptysis, sputum production, shortness of breath and stridor.    Cardiovascular: Negative for chest pain, palpitations and leg swelling.   Gastrointestinal: Negative for blood in stool, diarrhea and vomiting. Abdominal pain: Abdominal discomfort.   Genitourinary: Negative for flank pain, hematuria and urgency.   Musculoskeletal: Negative for myalgias.    Skin: Negative.    Neurological: Positive for weakness. Negative for speech change, focal weakness, seizures and loss of consciousness.   Endo/Heme/Allergies: Does not bruise/bleed easily.   Psychiatric/Behavioral: Negative for hallucinations and suicidal ideas. The patient is not nervous/anxious.      I had a advance care planning meeting with the patient and her son, Kameron.  I discussed the patient's goals of care, prognosis, and CODE STATUS.  The patient has advanced age, relatively good functional performance given her age, she has atrial fibrillation on anticoagulation.  Her imaging is highly suggestive of metastatic cancer, likely colon cancer.  However we do not have tissue diagnosis at this point. The patient's goal at this point is to live a comfortable life rather than prolonging her life.  So she wants to be admitted with DO NOT RESUSCITATE code.  However, she wants to have a biopsy done to confirm the suspected diagnosis.  After the diagnosis has been made, the patient would like to have a discussion with the rest of her family, sons to decide her final goal of care.  However she says it will most likely be comfort/hospice.  Her son Kameron, is in complete agreement with her current plan.   I spent 23 minutes in addition to the time spent managing the other medical problems.      Past Medical History   has a past medical history of Afib (HCC) and Hypertension.    Surgical History   has a past surgical history that includes femur nailing intramedullary (Left, 12/10/2017).     Family History  family history includes Breast Cancer in her sister.     Social History   reports that she has never smoked. She has never used smokeless tobacco. She reports that she does not drink alcohol or use drugs.    Allergies  No Known Allergies    Medications  Prior to Admission Medications   Prescriptions Last Dose Informant Patient Reported? Taking?   Cholecalciferol (VITAMIN D3) 2000 UNIT Cap  Patient Yes No   Sig: Take   by mouth.   Multiple Vitamins-Minerals (CENTRUM SILVER ADULT 50+) Tab  Patient Yes No   Sig: Take 1 Tab by mouth every day.   diltiazem (DILT-XR) 180 MG XR capsule   No No   Sig: Take 1 Cap by mouth every day. Needs to be seen for further refills. Thank you   latanoprost (XALATAN) 0.005 % Solution  Patient Yes No   Sig: Place 1 Drop in both eyes every evening.   lisinopril (PRINIVIL) 10 MG Tab   No No   Sig: Take 1 Tab by mouth every day. Needs to be seen for further refills. Thank you   oxycodone immediate-release (ROXICODONE) 5 MG Tab   No No   Sig: Take 1 Tab by mouth every 6 hours as needed (Severe Pain (NRS Pain Scale 7-10; CPOT Pain Scale 6-8)).   rivaroxaban (XARELTO) 10 MG Tab tablet   No No   Sig: Take 1 Tab by mouth with dinner.   senna-docusate (PERICOLACE OR SENOKOT S) 8.6-50 MG Tab   No No   Sig: Take 2 Tabs by mouth 2 Times a Day.      Facility-Administered Medications: None       Physical Exam  Temp:  [36.4 °C (97.5 °F)-36.8 °C (98.2 °F)] 36.8 °C (98.2 °F)  Pulse:  [] 94  Resp:  [9-27] 18  BP: (154-156)/(76-80) 154/80  SpO2:  [91 %-95 %] 94 %    Physical Exam   Constitutional: She is oriented to person, place, and time. No distress.   Chronically ill-appearing  Cachectic   HENT:   Head: Normocephalic and atraumatic.   Right Ear: External ear normal.   Left Ear: External ear normal.   Eyes: Pupils are equal, round, and reactive to light. Conjunctivae are normal. Right eye exhibits no discharge. Left eye exhibits no discharge.   Neck: Normal range of motion. Neck supple. No JVD present. No tracheal deviation present. No thyromegaly present.   Cardiovascular: Exam reveals no gallop and no friction rub.    No murmur heard.  Irregular irregularity   Pulmonary/Chest: Effort normal and breath sounds normal. No stridor. No respiratory distress. She has no wheezes. She has no rales. She exhibits no tenderness.   Abdominal: Soft. Bowel sounds are normal. She exhibits no distension. There is tenderness  (Right hypochondriac). There is no rebound and no guarding.   Musculoskeletal: Normal range of motion. She exhibits no edema, tenderness or deformity.   Neurological: She is alert and oriented to person, place, and time. No cranial nerve deficit. Coordination normal.   Skin: Skin is warm and dry. No rash noted. She is not diaphoretic. No erythema. There is pallor.   Psychiatric: She has a normal mood and affect. Judgment normal.   Nursing note and vitals reviewed.    Laboratory:          No results for input(s): ALTSGPT, ASTSGOT, ALKPHOSPHAT, TBILIRUBIN, DBILIRUBIN, GAMMAGT, AMYLASE, LIPASE, ALB, PREALBUMIN, GLUCOSE in the last 72 hours.              No results for input(s): TROPONINI in the last 72 hours.    Urinalysis:    No results found     Imaging:  OUTSIDE IMAGES-CT ABDOMEN /PELVIS   Final Result      OUTSIDE IMAGES-DX CHEST   Final Result      [id number] VIEW   Final Result      No dilated bowel identified      IR-CONSULT AND TREAT    (Results Pending)       Labs and imaging from the outside facility  CT abdomen pelvis chest from the transferring facility  There is no free intraperitoneal air.  There is right inguinal hernia containing small bowel without sign of obstruction or strangulation.  There are multiple lesions throughout the liver highly suspicious for metastases.  There is intussusception in the right lower quadrant which seems to be colonic and is probably related to a lead point mass.  There is no evidence of bowel obstruction.  4 mm pulmonary nodule    CBC, CMP from the transferring facility  WBC 10.2, hemoglobin 9.5, platelets 376, MCV 91, INR 1.6  Glucose 131, BUN 20, creatinine 0.6, sodium 128, potassium 4.1, chloride 94, bicarb 27  AST 49, ALT 41,     Urine analysis from the transferring facility  Color yellow, pH 5.5, ketone 1+, protein trace, bilirubin negative, blood trace, nitrate negative, leukocyte esterase negative, WBC 5-10, RBC 1-5.      Assessment/Plan:  I anticipate this  patient will require at least two midnights for appropriate medical management, necessitating inpatient admission.    * Liver metastases (HCC)- (present on admission)   Assessment & Plan    Multiple metastases to the liver found on imaging  Transferred from [place], likely colon cancer  IR for biopsy  I am ordering carcinoembryonic antigen   Palliative care consulted for goals of care     Intussusception (HCC)- (present on admission)   Assessment & Plan    Imaging from the Transferring Facility Shows there is intussusception in the right lower quadrant which seems to be colonic and is probably related to a lead point mass.    There is no evidence of bowel obstruction.  We are planning to do a liver biopsy at this point, to determine goals of care.  The patient will likely go with hospice/comfort  The patient does not have significant pain at this point she is having regular bowel movements.  Consider surgery consult according to the clinical course.      Pulmonary nodule- (present on admission)   Assessment & Plan    4 mm pulmonary nodule seen on imaging from the transferring facility  Likely cancer, planning to do a liver biopsy at this point  Patient and family will likely go with comfort measures after diagnosis is achieved.     Anemia- (present on admission)   Assessment & Plan    Hemoglobin 9.9  Likely multifactorial, poor oral intake, cancer, on anticoagulation  I am checking iron studies, ferritin, reticulocyte count, serum iron, B12, folic acid  Continue to monitor hemoglobin  Transfuse for hemoglobin below 7     Elevated liver enzymes- (present on admission)   Assessment & Plan    Mostly due to metastatic cancer  I am holding her atorvastatin  Avoid hepatotoxins as much as possible.  Continue to monitor     Chronic anticoagulation- (present on admission)   Assessment & Plan    For atrial fibrillation  I will hold her apixaban for anticipated biopsy      Advance care planning   Assessment & Plan    I had  a advance care planning meeting with the patient and her son, Kameron on 2/15/2019.    I discussed the patient's goals of care, prognosis, and CODE STATUS.    The patient has advanced age, and relatively good functional performance given her age.  She has atrial fibrillation on anticoagulation.  Her imaging is highly suggestive of metastatic cancer, likely colon cancer.  However we do not have tissue diagnosis at this point. The patient's goal at this point is to live a comfortable life rather than prolonging her life.  So she wants to be admitted with DO NOT RESUSCITATE code.  However, she wants to have a biopsy done to confirm the suspected diagnosis.    After the diagnosis has been made, the patient would like to have a discussion with the rest of her family, sons to decide her final goal of care.  However she says it will most likely be comfort/hospice.  Her son Kameron, is in complete agreement with her current plan.      Atrial fibrillation (HCC)- (present on admission)   Assessment & Plan    Currently rate controlled  Continue diltiazem  I will hold her apixaban for anticipated biopsy     Dyslipidemia- (present on admission)   Assessment & Plan    Holding her atorvastatin, given her elevated liver enzymes and liver metastases   Atorvastatin at this age will unlikely change her prognosis     HTN (hypertension)- (present on admission)   Assessment & Plan    Lisinopril, diltiazem  Hydralazine as needed for extreme hypertension           VTE prophylaxis: SCDs, I am holding her apixaban for anticipated biopsy

## 2019-02-15 NOTE — ASSESSMENT & PLAN NOTE
Imaging from the Transferring Facility Shows there is intussusception in the right lower quadrant which seems to be colonic and is probably related to a lead point mass.    There is no evidence of bowel obstruction.  We are planning to do a liver biopsy at this point, to determine goals of care.  The patient will likely go with hospice/comfort  The patient does not have significant pain at this point she is having regular bowel movements.  Consider surgery consult according to the clinical course.

## 2019-02-15 NOTE — ED NOTES
Report given to THADDEUS Davis. Pt alert, oriented, and no distress at this time. Pt updated on POC.

## 2019-02-15 NOTE — ASSESSMENT & PLAN NOTE
Multiple metastases to the liver found on imaging  Transferred from [place], likely colon cancer  IR for biopsy  I am ordering carcinoembryonic antigen   Palliative care consulted for goals of care

## 2019-02-15 NOTE — ED TRIAGE NOTES
"Alissa Shahid  89 y.o. female  Chief Complaint   Patient presents with   • Sent by MD Barajas transferred from [facility] for Oncology consult       Pt BIB EMS for above CC.   Pt reports to going to PCP yesterday with complaints of RUQ abdominal pain, loss of appetite, constipation (x 2 day), and a cough x4 weeks. Pt found to have mass on liver and possible METS to lungs.   Pt is alert and oriented, speaking in full sentences, follows commands and responds appropriately to questions. NAD. Resp are even and unlabored.     NIBP: 149/103, Pulse: (!) 108, Respiration: 19, Temperature: 36.5 °C (97.7 °F), Height: 157.5 cm (5' 2\"), Weight: 51.6 kg (113 lb 12.1 oz), BMI (Calculated): 20.81, BSA (Calculated): 1.5, Pulse Oximetry: 95 %, O2 (LPM): 0, O2 Delivery: None (Room Air)      "

## 2019-02-15 NOTE — ED PROVIDER NOTES
ED Provider Note    CHIEF COMPLAINT  Chief Complaint   Patient presents with   • Sent by MD Barajas transferred from [place] for Oncology consult       HPI  Alissa Key is a 89 y.o. female who presents for evaluation of new diagnosis of possible cancer.  The patient is a 89-year-old female.  She lives in [facility].  She presented to the emergency department in [place] yesterday.  She apparently had intermittent episodes of right upper quadrant pain and overall feeling generally poor.  She had extensive workup which demonstrated possible colonic mass with metastases to the liver.  There is no suggestion of bowel obstruction.  She was transferred here for higher level of care.  She currently denies any nausea vomiting or diarrhea.  No high fevers or chills no numbness weakness or tingling no black or bloody stools no abdominal distention no hematemesis or hematochezia    REVIEW OF SYSTEMS  See HPI for further details.  No night sweats weight loss numbness tingling weakness rash all other systems are negative.     PAST MEDICAL HISTORY  Past Medical History:   Diagnosis Date   • Afib (HCC)    • Hypertension        FAMILY HISTORY  Noncontributory    SOCIAL HISTORY  Social History     Social History   • Marital status:      Spouse name: N/A   • Number of children: N/A   • Years of education: N/A     Social History Main Topics   • Smoking status: Never Smoker   • Smokeless tobacco: Never Used   • Alcohol use No   • Drug use: No   • Sexual activity: Not on file     Other Topics Concern   • Not on file     Social History Narrative   • No narrative on file       SURGICAL HISTORY  Past Surgical History:   Procedure Laterality Date   • FEMUR NAILING INTRAMEDULLARY Left 12/10/2017    Procedure: FEMUR NAILING INTRAMEDULLARY;  Surgeon: Mik Ortiz M.D.;  Location: SURGERY [facility];  Service: Orthopedics       CURRENT MEDICATIONS  Home Medications     Reviewed by Gabrielle Louis,  "PhT (Pharmacy Tech) on 02/15/19 at 0834  Med List Status: Complete   Medication Last Dose Status   apixaban (ELIQUIS) 2.5mg Tab 2/14/2019 Active   atorvastatin (LIPITOR) 20 MG Tab 2/14/2019 Active   diltiazem (DILT-XR) 180 MG XR capsule 2/14/2019 Active   lisinopril (PRINIVIL) 10 MG Tab 2/14/2019 Active   Multiple Vitamins-Minerals (CENTRUM SILVER ADULT 50+) Tab 2/14/2019 Active                ALLERGIES  No Known Allergies    PHYSICAL EXAM  VITAL SIGNS: Pulse 99   Temp 36.5 °C (97.7 °F) (Temporal)   Resp 20   Ht 1.575 m (5' 2\")   Wt 51.6 kg (113 lb 12.1 oz)   SpO2 95%   BMI 20.81 kg/m²       Constitutional: Elderly no distress  HENT: Normocephalic, Atraumatic, Bilateral external ears normal, Oropharynx moist, No oral exudates, Nose normal.   Eyes: PERRLA, EOMI, Conjunctiva normal, No discharge.   Neck: Normal range of motion, No tenderness, Supple, No stridor.   Lymphatic: No lymphadenopathy noted.   Cardiovascular mild tachycardia, Normal rhythm, No murmurs, No rubs, No gallops.   Thorax & Lungs: Normal breath sounds, No respiratory distress, No wheezing, No chest tenderness.   Abdomen: Bowel sounds normal, Soft, No tenderness, No masses, No pulsatile masses.   Skin: Warm, Dry, No erythema, No rash.   Back: No tenderness, No CVA tenderness.   Extremities: Intact distal pulses, No edema, No tenderness, No cyanosis, No clubbing.   Neurologic: Alert & oriented x 3, Normal motor function, Normal sensory function, No focal deficits noted.   Psychiatric: Affect normal, Judgment normal, Mood normal.       COURSE & MEDICAL DECISION MAKING  Pertinent Labs & Imaging studies reviewed. (See chart for details)  Chest x-ray from outside facility demonstrates cardiomegaly fullness in the superior mediastinum concerning for lymphadenopathy.  Nonspecific opacity in the right lung base.  CT scan from outside facility demonstrates multiple lesions throughout the liver suspicious for metastases.  There is intussusception in the " right lower quadrant likely colocolic likely related to a lead point mass.  No evidence of bowel obstruction.  Right inguinal hernia.  4 mm pulmonary nodule    Laboratory studies from outside facility demonstrate a white blood cell count of 10.2 H&H of 9.5 and 29 platelets 376.  Metabolic panel notable for INR of 1.6 glucose 131 BUN 20 creatinine 0.6 sodium 128 potassium 4.1 chloride 94 CO2 27 AST 49 ALT 41 TSH normal at 2.4    [id number] VIEW   Final Result      No dilated bowel identified          An IV had already been established.  I reviewed the patient's records.  Clinically she does not appear to be acutely obstructed.  She was essentially sent here for new diagnosis of likely cancer.  She does not appear to be septic.  I will order a plain film of the abdomen to assess her bowel pattern and she will be admitted to internal medicine    FINAL IMPRESSION  1.  Metastatic lesions to the liver  2.  Question of transient intussusception of the colon    Admission         Electronically signed by: Woody Das, 2/15/2019 6:49 AM

## 2019-02-15 NOTE — ASSESSMENT & PLAN NOTE
Hemoglobin 9.9  Likely multifactorial, poor oral intake, cancer, on anticoagulation  I am checking iron studies, ferritin, reticulocyte count, serum iron, B12, folic acid  Continue to monitor hemoglobin  Transfuse for hemoglobin below 7

## 2019-02-15 NOTE — ASSESSMENT & PLAN NOTE
I had a advance care planning meeting with the patient and her son, Kameron on 2/15/2019.    I discussed the patient's goals of care, prognosis, and CODE STATUS.    The patient has advanced age, and relatively good functional performance given her age.  She has atrial fibrillation on anticoagulation.  Her imaging is highly suggestive of metastatic cancer, likely colon cancer.  However we do not have tissue diagnosis at this point. The patient's goal at this point is to live a comfortable life rather than prolonging her life.  So she wants to be admitted with DO NOT RESUSCITATE code.  However, she wants to have a biopsy done to confirm the suspected diagnosis.    After the diagnosis has been made, the patient would like to have a discussion with the rest of her family, sons to decide her final goal of care.  However she says it will most likely be comfort/hospice.  Her son Kameron, is in complete agreement with her current plan.

## 2019-02-15 NOTE — ED NOTES
Pt transferred to oncology. Pt alert, no distress, VSS. Pt transported via [facility] from transport.

## 2019-02-15 NOTE — ASSESSMENT & PLAN NOTE
Holding her atorvastatin, given her elevated liver enzymes and liver metastases   Atorvastatin at this age will unlikely change her prognosis

## 2019-02-15 NOTE — ED NOTES
Bedside report taken from night shift RN. This RN assuming care of patient at this time. Pt alert, oriented and no distress at this time. Pt updated on POC. Pt verbalized understanding. Call bell in reach. Continue to monitor.

## 2019-02-15 NOTE — ASSESSMENT & PLAN NOTE
Mostly due to metastatic cancer  I am holding her atorvastatin  Avoid hepatotoxins as much as possible.  Continue to monitor

## 2019-02-15 NOTE — ASSESSMENT & PLAN NOTE
4 mm pulmonary nodule seen on imaging from the transferring facility  Likely cancer, planning to do a liver biopsy at this point  Patient and family will likely go with comfort measures after diagnosis is achieved.

## 2019-02-16 ENCOUNTER — PATIENT OUTREACH (OUTPATIENT)
Dept: HEALTH INFORMATION MANAGEMENT | Facility: OTHER | Age: 84
End: 2019-02-16

## 2019-02-16 ENCOUNTER — APPOINTMENT (OUTPATIENT)
Dept: RADIOLOGY | Facility: MEDICAL CENTER | Age: 84
DRG: 436 | End: 2019-02-16
Attending: HOSPITALIST
Payer: MEDICARE

## 2019-02-16 VITALS
DIASTOLIC BLOOD PRESSURE: 81 MMHG | WEIGHT: 108.69 LBS | OXYGEN SATURATION: 91 % | TEMPERATURE: 97.6 F | SYSTOLIC BLOOD PRESSURE: 143 MMHG | BODY MASS INDEX: 20 KG/M2 | RESPIRATION RATE: 18 BRPM | HEIGHT: 62 IN | HEART RATE: 85 BPM

## 2019-02-16 DIAGNOSIS — R16.0 LIVER MASSES: ICD-10-CM

## 2019-02-16 LAB
ALBUMIN SERPL BCP-MCNC: 2.3 G/DL (ref 3.2–4.9)
ALBUMIN/GLOB SERPL: 0.8 G/DL
ALP SERPL-CCNC: 90 U/L (ref 30–99)
ALT SERPL-CCNC: 19 U/L (ref 2–50)
ANION GAP SERPL CALC-SCNC: 7 MMOL/L (ref 0–11.9)
AST SERPL-CCNC: 26 U/L (ref 12–45)
BASOPHILS # BLD AUTO: 0.2 % (ref 0–1.8)
BASOPHILS # BLD: 0.02 K/UL (ref 0–0.12)
BILIRUB SERPL-MCNC: 0.5 MG/DL (ref 0.1–1.5)
BUN SERPL-MCNC: 15 MG/DL (ref 8–22)
CALCIUM SERPL-MCNC: 7.7 MG/DL (ref 8.5–10.5)
CHLORIDE SERPL-SCNC: 105 MMOL/L (ref 96–112)
CO2 SERPL-SCNC: 23 MMOL/L (ref 20–33)
CREAT SERPL-MCNC: 0.35 MG/DL (ref 0.5–1.4)
EOSINOPHIL # BLD AUTO: 0.04 K/UL (ref 0–0.51)
EOSINOPHIL NFR BLD: 0.4 % (ref 0–6.9)
ERYTHROCYTE [DISTWIDTH] IN BLOOD BY AUTOMATED COUNT: 47.1 FL (ref 35.9–50)
GLOBULIN SER CALC-MCNC: 2.9 G/DL (ref 1.9–3.5)
GLUCOSE SERPL-MCNC: 141 MG/DL (ref 65–99)
HCT VFR BLD AUTO: 25.7 % (ref 37–47)
HGB BLD-MCNC: 9 G/DL (ref 12–16)
IMM GRANULOCYTES # BLD AUTO: 0.06 K/UL (ref 0–0.11)
IMM GRANULOCYTES NFR BLD AUTO: 0.7 % (ref 0–0.9)
LYMPHOCYTES # BLD AUTO: 1.29 K/UL (ref 1–4.8)
LYMPHOCYTES NFR BLD: 14.2 % (ref 22–41)
MCH RBC QN AUTO: 34 PG (ref 27–33)
MCHC RBC AUTO-ENTMCNC: 35 G/DL (ref 33.6–35)
MCV RBC AUTO: 97 FL (ref 81.4–97.8)
MONOCYTES # BLD AUTO: 1.06 K/UL (ref 0–0.85)
MONOCYTES NFR BLD AUTO: 11.7 % (ref 0–13.4)
NEUTROPHILS # BLD AUTO: 6.6 K/UL (ref 2–7.15)
NEUTROPHILS NFR BLD: 72.8 % (ref 44–72)
NRBC # BLD AUTO: 0 K/UL
NRBC BLD-RTO: 0 /100 WBC
PLATELET # BLD AUTO: 373 K/UL (ref 164–446)
PMV BLD AUTO: 9.6 FL (ref 9–12.9)
POTASSIUM SERPL-SCNC: 3.6 MMOL/L (ref 3.6–5.5)
PROT SERPL-MCNC: 5.2 G/DL (ref 6–8.2)
RBC # BLD AUTO: 2.65 M/UL (ref 4.2–5.4)
SODIUM SERPL-SCNC: 135 MMOL/L (ref 135–145)
WBC # BLD AUTO: 9.1 K/UL (ref 4.8–10.8)

## 2019-02-16 PROCEDURE — 99239 HOSP IP/OBS DSCHRG MGMT >30: CPT | Performed by: INTERNAL MEDICINE

## 2019-02-16 PROCEDURE — A9270 NON-COVERED ITEM OR SERVICE: HCPCS | Performed by: HOSPITALIST

## 2019-02-16 PROCEDURE — 36415 COLL VENOUS BLD VENIPUNCTURE: CPT

## 2019-02-16 PROCEDURE — 80053 COMPREHEN METABOLIC PANEL: CPT

## 2019-02-16 PROCEDURE — 85025 COMPLETE CBC W/AUTO DIFF WBC: CPT

## 2019-02-16 PROCEDURE — 700102 HCHG RX REV CODE 250 W/ 637 OVERRIDE(OP): Performed by: HOSPITALIST

## 2019-02-16 RX ADMIN — DILTIAZEM HYDROCHLORIDE 180 MG: 180 CAPSULE, COATED, EXTENDED RELEASE ORAL at 07:57

## 2019-02-16 RX ADMIN — LISINOPRIL 10 MG: 10 TABLET ORAL at 07:57

## 2019-02-16 NOTE — PROGRESS NOTES
Pt alert and oriented x 4. Denies nausea but complains of intermittent sharp LLQ pain. Declines intervention. Up with SBA and tolerates well. PIV to L AC leaking. Fluids stopped by NOC nurse. Son at the bedside. Pt resting comfortably at this time. Call light within reach. Hourly rounding in place.

## 2019-02-16 NOTE — PROGRESS NOTES
Pt arrived to the unit via gurney. Alert but disoriented to time. Denies pain or nausea. PIV to L AC flushing well with no pain or redness at the site. Orders for NS w KCL. K+ 4.1. NPO. Discussed with Dr Dobbins who stated to keep pt NPO for potential procedure. Fluids changed to NS per MD. Fluids initiated. Admission completed. Pt up with one person assist. Resting comfortably. Call light within reach. Hourly rounding in place.

## 2019-02-16 NOTE — PROGRESS NOTES
Pt and pt's son provided with all discharge instructions including home medications, follow up appointments and when to seek medical attention. All questions answered. PIV to L AC dc'd. Awaiting incontinence briefs for discharge home.

## 2019-02-16 NOTE — PROGRESS NOTES
Pt A&Ox3. Up one person assist to bathroom. Pt son at bedside. Mirela from IR called at start of shift and reported that the liver biopsy that was planned for today can be done as an outpatient procedure and that they will not be able to fit patient into the schedule because of the holiday weekend; updated patient son at bedside--that it will be confirmed and addressed today. No reports of pain. IV fluids infusing per MAR. Strip alarm for safety. All needs met.

## 2019-02-16 NOTE — DISCHARGE INSTRUCTIONS
Liver Biopsy  The liver is a large organ in the upper right-hand side of your abdomen. A liver biopsy is a procedure in which a tissue sample is taken from the liver and examined under a microscope. The procedure is done to confirm a suspected problem.  There are three types of liver biopsies:  · Percutaneous. In this type, an incision is made in your abdomen. The sample is removed through the incision with a needle.  · Laparoscopic. In this type, several incisions are made in the abdomen. A tiny camera is passed through one of the incisions to help guide the health care provider. The sample is removed through the other incision or incisions.  · Transjugular. In this type, an incision is made in the neck. A tube is passed through the incision to the liver. The sample is removed through the tube with a needle.  Tell a health care provider about:  · Any allergies you have.  · All medicines you are taking, including vitamins, herbs, eye drops, creams, and over-the-counter medicines.  · Any problems you or family members have had with anesthetic medicines.  · Any blood disorders you have.  · Any surgeries you have had.  · Any medical conditions you have.  · Possibility of pregnancy, if this applies.  What are the risks?  Generally, this is a safe procedure. However, problems can occur and include:  · Bleeding.  · Infection.  · Bruising.  · Collapsed lung.  · Leak of digestive juices (bile) from the liver or gallbladder.  · Problems with heart rhythm.  · Pain at the biopsy site or in the right shoulder.  · Low blood pressure (hypotension).  · Injury to nearby organs or tissues.  What happens before the procedure?  · Your health care provider may do some blood or urine tests. These will help your health care provider learn how well your kidneys and liver are working and how well your blood clots.  · Ask your health care provider if you will be able to go home the day of the procedure. Arrange for someone to take you home  and stay with you for at least 24 hours.  · Do not eat or drink anything after midnight on the night before the procedure or as directed by your health care provider.  · Ask your health care provider about:  ¨ Changing or stopping your regular medicines. This is especially important if you are taking diabetes medicines or blood thinners.  ¨ Taking medicines such as aspirin and ibuprofen. These medicines can thin your blood. Do not take these medicines before your procedure if your health care provider asks you not to.  What happens during the procedure?  Regardless of the type of biopsy that will be done, you will have an IV line placed. Through this line, you will receive fluids and medicine to relax you. If you will be having a laparoscopic biopsy, you may also receive medicine through this line to make you sleep during the procedure (general anesthetic).  Percutaneous Liver Biopsy  · You will positioned on your back, with your right hand over your head.  · A health care provider will locate your liver by tapping and pressing on the right side of your abdomen or with the help of an ultrasound machine or CT scan.  · An area at the bottom of your last right rib will be numbed.  · An incision will be made in the numbed area.  · The biopsy needle will be inserted into the incision.  · Several samples of liver tissue will be taken with the biopsy needle. You will be asked to hold your breath as each sample is taken.  Laparoscopic Liver Biopsy  · You will be positioned on your back.  · Several small incisions will be made in your abdomen.  · Your doctor will pass a tiny camera through one incision. The camera will allow the liver to be viewed on a TV monitor in the operating room.  · Tools will be passed through the other incision or incisions. These tools will be used to remove samples of liver tissue.  Transjugular Liver Biopsy  · You will be positioned on your back on an X-ray table, with your head turned to your  left.  · An area on your neck just over your jugular vein will be numbed.  · An incision will be made in the numbed area.  · A tiny tube will be inserted through the incision. It will be pushed through the jugular vein to a blood vessel in the liver called the hepatic vein.  · Dye will be inserted through the tube, and X-rays will be taken. The dye will make the blood vessels in the liver light up on the X-rays.  · The biopsy needle will be pushed through the tube until it reaches the liver.  · Samples of liver tissue will be taken with the biopsy needle.  · The needle and the tube will be removed.  After the samples are obtained, the incision or incisions will be closed.  What happens after the procedure?  · You will be taken to a recovery area.  · You may have to lie on your right side for 1-2 hours. This will prevent bleeding from the biopsy site.  · Your progress will be watched. Your blood pressure, pulse, and the biopsy site will be checked often.  · You may have some pain or feel sick. If this happens, tell your health care provider.  · As you begin to feel better, you will be offered ice and beverages.  · You may be allowed to go home when the medicines have worn off and you can walk, drink, eat, and use the bathroom.  This information is not intended to replace advice given to you by your health care provider. Make sure you discuss any questions you have with your health care provider.  Document Released: 03/09/2005 Document Revised: 05/22/2017 Document Reviewed: 02/13/2015  [employer] Interactive Patient Education © 2017 [employer]    Discharge Instructions    Discharged to home by car with relative. Discharged via wheelchair, hospital escort: Yes.  Special equipment needed: Not Applicable    Be sure to schedule a follow-up appointment with your primary care doctor or any specialists as instructed.     Discharge Plan:   Diet Plan: Discussed  Activity Level: Discussed  Confirmed Follow up Appointment:  Patient to Call and Schedule Appointment  Confirmed Symptoms Management: Discussed  Medication Reconciliation Updated: Yes  Pneumococcal Vaccine Administered/Refused: Not given - Patient refused pneumococcal vaccine  Influenza Vaccine Indication: Not indicated: Previously immunized this influenza season and > 8 years of age    I understand that a diet low in cholesterol, fat, and sodium is recommended for good health. Unless I have been given specific instructions below for another diet, I accept this instruction as my diet prescription.   Other diet: regular as tolerated    Special Instructions: None    · Is patient discharged on Warfarin / Coumadin?   No     Depression / Suicide Risk    As you are discharged from this [facility] facility, it is important to learn how to keep safe from harming yourself.    Recognize the warning signs:  · Abrupt changes in personality, positive or negative- including increase in energy   · Giving away possessions  · Change in eating patterns- significant weight changes-  positive or negative  · Change in sleeping patterns- unable to sleep or sleeping all the time   · Unwillingness or inability to communicate  · Depression  · Unusual sadness, discouragement and loneliness  · Talk of wanting to die  · Neglect of personal appearance   · Rebelliousness- reckless behavior  · Withdrawal from people/activities they love  · Confusion- inability to concentrate     If you or a loved one observes any of these behaviors or has concerns about self-harm, here's what you can do:  · Talk about it- your feelings and reasons for harming yourself  · Remove any means that you might use to hurt yourself (examples: pills, rope, extension cords, firearm)  · Get professional help from the community (Mental Health, Substance Abuse, psychological counseling)  · Do not be alone:Call your Safe Contact- someone whom you trust who will be there for you.  · Call your local CRISIS HOTLINE 740-3667 or  585-722-2998  · Call your local Children's Mobile Crisis Response Team [place] (776) 207-5592 or [email or web address]  · Call the toll free National Suicide Prevention Hotlines   · National Suicide Prevention Lifeline 799-730-BWZR (9032)  · National PICS Auditing Line Network 800-SUICIDE (464-0759)

## 2019-02-16 NOTE — PROGRESS NOTES
IR Nursing Note:    Called bedside RN to arrange time for Liver biopsy.  Per Susan TRAVIS patient is being discharged home.  Advised RN to have patient call as an outpatient.

## 2019-02-16 NOTE — FLOWSHEET NOTE
02/15/19 1704   Events/Summary/Plan   Events/Summary/Plan RCP completed. Placed pt on 1L NC to keep sats >90%   Non-Invasive Resp Device Site Inspection Completed Intact   Chest Exam   Respiration 16   Pulse 73   Breath Sounds   RUL Breath Sounds Crackles   RML Breath Sounds Diminished   RLL Breath Sounds Diminished   GE Breath Sounds Crackles   LLL Breath Sounds Diminished   Oximetry   #Pulse Oximetry (Single Determination) Yes   Oxygen   Home O2 Use Prior To Admission? No   Pulse Oximetry 91 %   O2 (LPM) 1   O2 Daily Delivery Respiratory  Silicone Nasal Cannula

## 2019-02-16 NOTE — DISCHARGE SUMMARY
"Discharge Summary    CHIEF COMPLAINT ON ADMISSION  Chief Complaint   Patient presents with   • Sent by MD Barajas transferred from [facility] for Oncology consult       Reason for Admission  EMS TRANSFER     Admission Date  2/15/2019    CODE STATUS  Prior    HPI & HOSPITAL COURSE  This is a 89 y.o. female here with mild abdominal pain and presented to ER at [facility].  Workup there showed masses in liver, colon and lung.  Given the high suspicion of malignancy, workup including biopsy was discussed with the patient and she would like to know what the malignancy is and prognosis but does not plan to agree to any chemo, radiation or surgical intervention.  Biopsy could not be coordinated on day of admission and will not be done on the weekend or holiday on Monday, so the earliest intervention for patient would be Tuesday.  Patient does not want to remain in the hospital until that time and also states her pain is not bad and feels if she took a tylenol or aspirin when it was \"severe\" she would be fine.  I spoke to scheduling and IR will be calling patient on Tuesday to schedule an outpatient biopsy and then patient will follow up with the oncology , Damaris Naranjo in 2 weeks for results and discussion of goals of care.  Patient also agreeable to hospice assistance and I explained to her and son that the hospice in [facility] is [facility] and they are a volunteer organization.  She is tolerating PO intake without issue, minimal pain and has an understanding of her metastatic disease.  Patient was also instructed to stop taking Eliquis as risk of bleeding and complication from that were higher than her stroke risk from atrial fibrillation.       Therefore, she is discharged in fair and stable condition to home with close outpatient follow-up.    The patient recovered much more quickly than anticipated on admission.    Discharge Date  2/16/2019    FOLLOW UP ITEMS POST DISCHARGE  IR biopsy of " liver - [facility] radiology will call patient to schedule procedure  PCP - THIERRY Huizar APN - oncology     DISCHARGE DIAGNOSES  Principal Problem:    Liver metastases (HCC) POA: Yes  Active Problems:    Anemia POA: Yes    Pulmonary nodule POA: Yes    Intussusception (HCC) POA: Yes    Atrial fibrillation (HCC) POA: Yes    Advance care planning POA: No    Chronic anticoagulation POA: Yes    Elevated liver enzymes POA: Yes    HTN (hypertension) POA: Yes    Dyslipidemia POA: Yes  Resolved Problems:    * No resolved hospital problems. *      FOLLOW UP  No future appointments.  YVETTE HuizraP.NTheodore  [address] [place] [address] [phone]      PLease call to schedule your follow up appointment, office is closed for the weekend thank you     [facility] INTERVENTIONAL RAD  [address] [facility] [phone]  Schedule an appointment as soon as possible for a visit  for liver biopsy    Damaris Naranjo, A.P.N.  [address]  [facility] [address] [phone]    In 2 weeks        MEDICATIONS ON DISCHARGE     Medication List      CONTINUE taking these medications      Instructions   atorvastatin 20 MG Tabs  Commonly known as:  LIPITOR   Take 20 mg by mouth every evening.  Dose:  20 mg     CENTRUM SILVER ADULT 50+ Tabs   Take 1 Tab by mouth every day.  Dose:  1 Tab     diltiazem 180 MG XR capsule  Commonly known as:  DILT-XR   Take 1 Cap by mouth every day. Needs to be seen for further refills. Thank you  Dose:  180 mg     lisinopril 10 MG Tabs  Commonly known as:  PRINIVIL   Take 1 Tab by mouth every day. Needs to be seen for further refills. Thank you  Dose:  10 mg        STOP taking these medications    ELIQUIS 2.5mg Tabs  Generic drug:  apixaban            Allergies  No Known Allergies    DIET  No orders of the defined types were placed in this encounter.      ACTIVITY  As tolerated.  Weight bearing as  tolerated    CONSULTATIONS  none    PROCEDURES  none    LABORATORY  Lab Results   Component Value Date    SODIUM 135 02/16/2019    POTASSIUM 3.6 02/16/2019    CHLORIDE 105 02/16/2019    CO2 23 02/16/2019    GLUCOSE 141 (H) 02/16/2019    BUN 15 02/16/2019    CREATININE 0.35 (L) 02/16/2019        Lab Results   Component Value Date    WBC 9.1 02/16/2019    HEMOGLOBIN 9.0 (L) 02/16/2019    HEMATOCRIT 25.7 (L) 02/16/2019    PLATELETCT 373 02/16/2019        Total time of the discharge process exceeds 32 minutes.

## 2019-02-27 ENCOUNTER — OFFICE VISIT (OUTPATIENT)
Dept: HEMATOLOGY ONCOLOGY | Facility: MEDICAL CENTER | Age: 84
End: 2019-02-27
Payer: MEDICARE

## 2019-02-27 VITALS
HEART RATE: 66 BPM | RESPIRATION RATE: 16 BRPM | BODY MASS INDEX: 20.77 KG/M2 | WEIGHT: 110.01 LBS | OXYGEN SATURATION: 95 % | HEIGHT: 61 IN | DIASTOLIC BLOOD PRESSURE: 62 MMHG | TEMPERATURE: 98.7 F | SYSTOLIC BLOOD PRESSURE: 118 MMHG

## 2019-02-27 DIAGNOSIS — R16.0 LIVER MASSES: ICD-10-CM

## 2019-02-27 PROCEDURE — 99205 OFFICE O/P NEW HI 60 MIN: CPT | Performed by: NURSE PRACTITIONER

## 2019-02-27 RX ORDER — ASPIRIN 81 MG/1
162 TABLET, CHEWABLE ORAL DAILY
COMMUNITY

## 2019-02-27 ASSESSMENT — ENCOUNTER SYMPTOMS
CONSTIPATION: 0
INSOMNIA: 0
DIARRHEA: 0
SPUTUM PRODUCTION: 1
COUGH: 1
SHORTNESS OF BREATH: 0
WEIGHT LOSS: 0
CHILLS: 0
ABDOMINAL PAIN: 1
VOMITING: 0
HEADACHES: 0
FEVER: 0
NAUSEA: 0
DIZZINESS: 0
MYALGIAS: 0

## 2019-02-27 ASSESSMENT — PAIN SCALES - GENERAL: PAINLEVEL: NO PAIN

## 2019-02-28 NOTE — PROGRESS NOTES
Subjective:      Alissa Key is a 89 y.o. female who presents with New Patient (colon, liver and lung masses/Annie Pollock/ [facility]).        HPI    Patient referred to me, Intake Oncology Coordinator by hospitalist Dr. Pollock for abnormal CT scan concerning for malignancy.  Patient is accompanied by her son Kameron for today's visit.    Patient had been experiencing increased pain within her abdomen over a couple weeks.  Her son noted that she was experiencing some pain and stated he thought he felt a mass in the right upper quadrant area but thought it was due to constipation.  Due to the increase in pain she was seen in the emergency department at [facility].  At that time according to the son she had a CT scan completed along with a rectal exam.  Per son's information he was told that there was some blood noted on the rectal exam.  CT scan of the chest, abdomen and pelvis were also completed.  CT scan showed multiple lesions throughout the liver highly suspicious for metastasis along with intussusception in the right lower quadrant which could be related to a possible mass.  There was also a 4 mm pulmonary nodule noted.  Based on the CT findings patient was then sent over to [facility] for further evaluation and workup.  Patient was in the hospital and arrived here on a Friday and discussion with patient and son to proceed with a liver biopsy to determine malignancy.  She was interested in proceeding with the biopsy however she would have to wait through the weekend as well as the Monday holiday day and she did not want to stay in the hospital waiting for biopsy.  She was discharged and presumed a biopsy was scheduled however that was not completed.  She is being seen today to have follow-up discussion with regards to potential biopsy.    Does have atrial fibrillation and was on Eliquis in the past however that was recently stopped by her primary care provider.  She is also recently stopped  Lipitor.  According to the son that has given her increased in energy.  He does state that he has noticed she is sleeping more often.  Patient has been independent and living alone until just a few weeks ago when her son and his wife moved in with her to help care for her.  According to the patient she is no longer experiencing the abdominal pain.  Son stated she took a half a tablet of Percocet approximately 2 times since discharge and has not required any pain medicine since.  She does ambulate around the house with a walker for stability.  She did lose a significant amount of weight approximately 2 months ago but has been stable since.  She has been having a cough over the last couple months with some mild sputum production.  Patient denies any shortness of breath.  He denies any nausea, vomiting, diarrhea or constipation.  Her appetite is been very stable and she was very concerned about getting her dinner this evening.  She is voiding without difficulty denies any generalized pain, dizziness or headaches.    Long discussion with patient and son with regards to the CT scan results.  I personally reviewed the imaging report and images in detail and reviewed it with the patient and her son today.  Patient continued to note that she is 89 years old and no need for any further interventions however she is amendable to proceeding with a liver biopsy to determine origin of site.  I did discuss with them that if this did come back positive for malignancy then would recommend further discussion with specialist to determine all options.  She is amenable to hospice and did have that conversation with hospitalist at time of hospitalization.    Please see past medical and surgical history below.    Patient does have a family history of cancer.  2 sisters were diagnosed with breast cancer along with a niece with leukemia and a grandson with lung cancer.    Patient is a never smoker.    No Known Allergies  Current Outpatient  Prescriptions on File Prior to Visit   Medication Sig Dispense Refill   • diltiazem (DILT-XR) 180 MG XR capsule Take 1 Cap by mouth every day. Needs to be seen for further refills. Thank you 90 Cap 0   • lisinopril (PRINIVIL) 10 MG Tab Take 1 Tab by mouth every day. Needs to be seen for further refills. Thank you 90 Tab 0   • Multiple Vitamins-Minerals (CENTRUM SILVER ADULT 50+) Tab Take 1 Tab by mouth every day.     • atorvastatin (LIPITOR) 20 MG Tab Take 20 mg by mouth every evening.       No current facility-administered medications on file prior to visit.      Past Medical History:   Diagnosis Date   • Afib (HCC)     Atrial fib   • Hypertension      Past Surgical History:   Procedure Laterality Date   • FEMUR NAILING INTRAMEDULLARY Left 12/10/2017    Procedure: FEMUR NAILING INTRAMEDULLARY;  Surgeon: Mik Ortiz M.D.;  Location: SURGERY [facility];  Service: Orthopedics   • ABDOMINAL HYSTERECTOMY TOTAL         Family History   Problem Relation Age of Onset   • Breast Cancer Sister    • Cancer Sister         Breast cancer   • Cancer Other         Leukemia   • Cancer Other 40        Lung     Social History     Social History   • Marital status:      Spouse name: N/A   • Number of children: 5   • Years of education: N/A     Social History Main Topics   • Smoking status: Never Smoker   • Smokeless tobacco: Never Used   • Alcohol use No   • Drug use: No   • Sexual activity: Not on file     Other Topics Concern   • Not on file     Social History Narrative   • No narrative on file       Review of Systems   Constitutional: Positive for malaise/fatigue (sleeping more often). Negative for chills, fever and weight loss (not in the last 2 months).   Respiratory: Positive for cough (last couple of months) and sputum production (mild). Negative for shortness of breath.    Cardiovascular: Negative for chest pain.   Gastrointestinal: Positive for abdominal pain (every once in a while). Negative for  "constipation, diarrhea, nausea and vomiting.   Genitourinary: Negative for dysuria.   Musculoskeletal: Negative for myalgias.   Neurological: Negative for dizziness and headaches.   Psychiatric/Behavioral: The patient does not have insomnia.           Objective:     /62 (BP Location: Right arm, Patient Position: Sitting, BP Cuff Size: Adult)   Pulse 66   Temp 37.1 °C (98.7 °F) (Temporal)   Resp 16   Ht 1.549 m (5' 1\")   Wt 49.9 kg (110 lb 0.2 oz)   SpO2 95%   BMI 20.79 kg/m²      Physical Exam   Constitutional: She is oriented to person, place, and time. No distress.   Cachetic   HENT:   Head: Normocephalic and atraumatic.   Mouth/Throat: Oropharynx is clear and moist. No oropharyngeal exudate.   Eyes: Pupils are equal, round, and reactive to light. Conjunctivae and EOM are normal. Right eye exhibits no discharge. Left eye exhibits no discharge. No scleral icterus.   Neck: Normal range of motion. Neck supple. No thyromegaly present.   Cardiovascular: Normal rate, regular rhythm, normal heart sounds and intact distal pulses.  Exam reveals no gallop and no friction rub.    No murmur heard.  Pulmonary/Chest: Effort normal and breath sounds normal. No respiratory distress. She has no wheezes.   Abdominal: Soft. Bowel sounds are normal. She exhibits no distension. There is tenderness (RUQ).   Musculoskeletal: She exhibits no edema or tenderness.   Weakness - in a wheelchair d/t distance to travel   Lymphadenopathy:     She has no cervical adenopathy.   Neurological: She is alert and oriented to person, place, and time.   Skin: Skin is warm and dry. No rash noted. She is not diaphoretic. No erythema. No pallor.   Psychiatric: She has a normal mood and affect. Her behavior is normal.   Vitals reviewed.    CT CAP 2/15/19 - at [facility]  1.  Multiple lesions throughout the liver highly suspicious for metastasis.  2. Intussusception in the right lower quadrant which seems to be colocolic and is " probably related to the lead point mass.  No evidence of bowel obstruction.  3.  Right inguinal hernia containing small bowel without signs of obstruction.  4.  4 mm pulmonary nodule.  Given the findings of likely malignancy, [facility] follow-up recommendations for incidental nodules are not indicated.  Follow-up per patient's medical condition.       Assessment/Plan:     1. Liver masses  US-NEEDLE BX-LIVER     Plan  1.  Discussed with patient and son in detail CT results and concern for metastatic cancer.  According to the CT scan concerning for possible colon primary.  However she does have multiple lesions throughout the liver and recommendation for liver biopsy.  Long discussion with son and patient today and after further discussion patient is interested in proceeding with a biopsy to confirm diagnosis.  Discussed with patient and son today due to distance to travel will contact him with the results of the biopsy via phone and will then discuss further plan of care at that time.    Patient and son both verbalized understanding are in agreement with the plan.    I will follow-up with patient via phone with the results once received.      Spent 60 minutes in direct, face-to-face patient contact in which greater than 50% of the visit was spent counseling and coordinating of care.       Please note that this dictation was created using voice recognition software. I have made every reasonable attempt to correct obvious errors, but I expect that there are errors of grammar and possibly content that I did not discover before finalizing the note.

## 2019-03-08 ENCOUNTER — HOSPITAL ENCOUNTER (OUTPATIENT)
Facility: MEDICAL CENTER | Age: 84
End: 2019-03-08
Attending: INTERNAL MEDICINE | Admitting: INTERNAL MEDICINE
Payer: MEDICARE

## 2019-03-08 ENCOUNTER — APPOINTMENT (OUTPATIENT)
Dept: RADIOLOGY | Facility: MEDICAL CENTER | Age: 84
End: 2019-03-08
Attending: NURSE PRACTITIONER
Payer: MEDICARE

## 2019-03-08 VITALS
WEIGHT: 107.81 LBS | TEMPERATURE: 98.1 F | RESPIRATION RATE: 20 BRPM | OXYGEN SATURATION: 99 % | HEIGHT: 61 IN | DIASTOLIC BLOOD PRESSURE: 70 MMHG | HEART RATE: 85 BPM | BODY MASS INDEX: 20.35 KG/M2 | SYSTOLIC BLOOD PRESSURE: 149 MMHG

## 2019-03-08 DIAGNOSIS — R16.0 LIVER MASSES: ICD-10-CM

## 2019-03-08 PROCEDURE — 700101 HCHG RX REV CODE 250

## 2019-03-08 RX ORDER — LIDOCAINE HYDROCHLORIDE 20 MG/ML
INJECTION, SOLUTION INFILTRATION; PERINEURAL
Status: DISCONTINUED
Start: 2019-03-08 | End: 2019-03-08 | Stop reason: HOSPADM

## 2019-03-08 NOTE — PROGRESS NOTES
IR progress note:    I arrived at bedside to discuss the procedure and obtain consent from the patient who was there with her son or grandson. After discussing the risks and benefits of the procedure, the patient expressed some resignation to have procedure. She stated that the only reason she was proceeding with the biopsy was secondary to family members or friends recommending she do so. I reiterated that the decision was hers and hers alone to make. Although I did not participate in a thorough discussion of potential outcomes given different diagnoses, the patient expressed a desire not to proceed with biopsy secondary to her advanced age.  After careful consideration, the patient and I agreed to cancel the CT-guided biopsy.     Jeff Llamas MD

## 2019-03-15 NOTE — ADDENDUM NOTE
Encounter addended by: Laurie Mcbride on: 3/15/2019  2:09 PM<BR>    Actions taken: Medication note saved

## 2019-03-19 ENCOUNTER — TELEPHONE (OUTPATIENT)
Dept: HEMATOLOGY ONCOLOGY | Facility: MEDICAL CENTER | Age: 84
End: 2019-03-19

## 2019-03-20 NOTE — TELEPHONE ENCOUNTER
Spoke to patient's son, Kameron to see how patient is doing after Attempt of liver biopsy.  Does appear that patient had arrived to perform the biopsy however after further discussion with interventional radiologist plan was to abort the procedure.  Patient's son is staying with the patient at this time.  I did speak personally with Kameron this evening who stated she is doing very well and is okay with the decision that was made to not proceed with any further workup or biopsy of the liver lesion.    I did confirm with Kameron that patient is followed closely by primary care provider where she lives in [facility] and is scheduled to be seen in 2 days this week.  Discussed with Kameron that since no further workup will be completed there is no further follow-up needed with the [facility].  Patient to continue to follow-up with PCP at this time.  Kameron was appreciative of the phone call.

## (undated) DEVICE — SUCTION INSTRUMENT YANKAUER BULBOUS TIP W/O VENT (50EA/CA)

## (undated) DEVICE — Device

## (undated) DEVICE — PROTECTOR ULNA NERVE - (36PR/CA)

## (undated) DEVICE — SLEEVE, VASO, THIGH, MED

## (undated) DEVICE — PACK MAJOR BASIN - (2EA/CA)

## (undated) DEVICE — SPONGE GAUZESTER 4 X 4 4PLY - (128PK/CA)

## (undated) DEVICE — GOWN SURGEONS X-LARGE - DISP. (30/CA)

## (undated) DEVICE — SUTURE GENERAL

## (undated) DEVICE — ELECTRODE DUAL RETURN W/ CORD - (50/PK)

## (undated) DEVICE — GLOVE BIOGEL INDICATOR SZ 8 SURGICAL PF LTX - (50/BX 4BX/CA)

## (undated) DEVICE — STAPLER SKIN DISP - (6/BX 10BX/CA) VISISTAT

## (undated) DEVICE — DRAPE SURGICAL U 77X120 - (10/CA)

## (undated) DEVICE — CHLORAPREP 26 ML APPLICATOR - ORANGE TINT(25/CA)

## (undated) DEVICE — GLOVE COTTON STERILE (50/CA)

## (undated) DEVICE — GOWN WARMING STANDARD FLEX - (30/CA)

## (undated) DEVICE — CANISTER SUCTION 3000ML MECHANICAL FILTER AUTO SHUTOFF MEDI-VAC NONSTERILE LF DISP  (40EA/CA)

## (undated) DEVICE — GLOVE BIOGEL SZ 8 SURGICAL PF LTX - (50PR/BX 4BX/CA)

## (undated) DEVICE — SET LEADWIRE 5 LEAD BEDSIDE DISPOSABLE ECG (1SET OF 5/EA)

## (undated) DEVICE — KIT ANESTHESIA W/CIRCUIT & 3/LT BAG W/FILTER (20EA/CA)

## (undated) DEVICE — SODIUM CHL IRRIGATION 0.9% 1000ML (12EA/CA)

## (undated) DEVICE — SET EXTENSION WITH 2 PORTS (48EA/CA) ***PART #2C8610 IS A SUBSTITUTE*****

## (undated) DEVICE — NEPTUNE 4 PORT MANIFOLD - (20/PK)

## (undated) DEVICE — DRAPE C-ARM LARGE 41IN X 74 IN - (10/BX 2BX/CA)

## (undated) DEVICE — KIT ROOM DECONTAMINATION

## (undated) DEVICE — TOWELS CLOTH SURGICAL - (4/PK 20PK/CA)

## (undated) DEVICE — SENSOR SPO2 NEO LNCS ADHESIVE (20/BX) SEE USER NOTES

## (undated) DEVICE — HEAD HOLDER JUNIOR/ADULT

## (undated) DEVICE — GLOVE BIOGEL PI INDICATOR SZ 7.0 SURGICAL PF LF - (50/BX 4BX/CA)

## (undated) DEVICE — DRAPE U SPLIT IMP 54 X 76 - (24/CA)

## (undated) DEVICE — MASK ANESTHESIA ADULT  - (100/CA)

## (undated) DEVICE — ELECTRODE 850 FOAM ADHESIVE - HYDROGEL RADIOTRNSPRNT (50/PK)

## (undated) DEVICE — SUTURE 2-0 VICRYL PLUS CT-1 36 (36PK/BX)"

## (undated) DEVICE — TUBING CLEARLINK DUO-VENT - C-FLO (48EA/CA)

## (undated) DEVICE — DRESSING TRANSPARENT FILM TEGADERM 4 X 4.75" (50EA/BX)"

## (undated) DEVICE — DRAPE LARGE 3 QUARTER - (20/CA)

## (undated) DEVICE — LACTATED RINGERS INJ 1000 ML - (14EA/CA 60CA/PF)